# Patient Record
Sex: MALE | Race: WHITE | NOT HISPANIC OR LATINO | URBAN - METROPOLITAN AREA
[De-identification: names, ages, dates, MRNs, and addresses within clinical notes are randomized per-mention and may not be internally consistent; named-entity substitution may affect disease eponyms.]

---

## 2017-01-24 ENCOUNTER — OFFICE VISIT (OUTPATIENT)
Dept: FAMILY MEDICINE | Facility: CLINIC | Age: 37
End: 2017-01-24
Payer: COMMERCIAL

## 2017-01-24 VITALS
DIASTOLIC BLOOD PRESSURE: 81 MMHG | SYSTOLIC BLOOD PRESSURE: 134 MMHG | BODY MASS INDEX: 31.4 KG/M2 | TEMPERATURE: 99.2 F | HEIGHT: 69 IN | WEIGHT: 212 LBS | OXYGEN SATURATION: 98 % | HEART RATE: 61 BPM

## 2017-01-24 DIAGNOSIS — R07.89 ATYPICAL CHEST PAIN: Primary | ICD-10-CM

## 2017-01-24 DIAGNOSIS — R42 DIZZINESS: ICD-10-CM

## 2017-01-24 LAB
BASOPHILS # BLD AUTO: 0 10E9/L (ref 0–0.2)
BASOPHILS NFR BLD AUTO: 0.3 %
DIFFERENTIAL METHOD BLD: NORMAL
EOSINOPHIL # BLD AUTO: 0.1 10E9/L (ref 0–0.7)
EOSINOPHIL NFR BLD AUTO: 1.1 %
ERYTHROCYTE [DISTWIDTH] IN BLOOD BY AUTOMATED COUNT: 13 % (ref 10–15)
GLUCOSE BLD-MCNC: 88 MG/DL (ref 70–99)
HCT VFR BLD AUTO: 44.6 % (ref 40–53)
HGB BLD-MCNC: 15.3 G/DL (ref 13.3–17.7)
LYMPHOCYTES # BLD AUTO: 2.8 10E9/L (ref 0.8–5.3)
LYMPHOCYTES NFR BLD AUTO: 39.2 %
MCH RBC QN AUTO: 29.5 PG (ref 26.5–33)
MCHC RBC AUTO-ENTMCNC: 34.3 G/DL (ref 31.5–36.5)
MCV RBC AUTO: 86 FL (ref 78–100)
MONOCYTES # BLD AUTO: 0.6 10E9/L (ref 0–1.3)
MONOCYTES NFR BLD AUTO: 8.1 %
NEUTROPHILS # BLD AUTO: 3.7 10E9/L (ref 1.6–8.3)
NEUTROPHILS NFR BLD AUTO: 51.3 %
PLATELET # BLD AUTO: 175 10E9/L (ref 150–450)
RBC # BLD AUTO: 5.19 10E12/L (ref 4.4–5.9)
WBC # BLD AUTO: 7.2 10E9/L (ref 4–11)

## 2017-01-24 PROCEDURE — 85025 COMPLETE CBC W/AUTO DIFF WBC: CPT | Performed by: FAMILY MEDICINE

## 2017-01-24 PROCEDURE — 36415 COLL VENOUS BLD VENIPUNCTURE: CPT | Performed by: FAMILY MEDICINE

## 2017-01-24 PROCEDURE — 93000 ELECTROCARDIOGRAM COMPLETE: CPT | Performed by: FAMILY MEDICINE

## 2017-01-24 PROCEDURE — 99214 OFFICE O/P EST MOD 30 MIN: CPT | Performed by: FAMILY MEDICINE

## 2017-01-24 PROCEDURE — 82947 ASSAY GLUCOSE BLOOD QUANT: CPT | Performed by: FAMILY MEDICINE

## 2017-01-24 RX ORDER — MECLIZINE HYDROCHLORIDE 25 MG/1
25 TABLET ORAL 3 TIMES DAILY PRN
Qty: 30 TABLET | Refills: 0 | Status: SHIPPED | OUTPATIENT
Start: 2017-01-24 | End: 2017-02-24

## 2017-01-24 NOTE — PATIENT INSTRUCTIONS
1. I believe you have vertigo which is caused by a problem in the inner ear.    2. Take Meclizine as needed for symptoms - possible side effects are drowsiness, dry mouth or blurry vision.    3. Come back in one month for a physical with pre-visit labs. At that time I will ask you about the dizziness.

## 2017-01-24 NOTE — MR AVS SNAPSHOT
After Visit Summary   1/24/2017    Lam Brandt    MRN: [id number]           Patient Information     Date Of Birth          1980        Visit Information        Provider Department      1/24/2017 3:10 PM Kamron Galaviz MD [facility]        Today's Diagnoses     Atypical chest pain    -  1     Dizziness           Care Instructions    1. I believe you have vertigo which is caused by a problem in the inner ear.    2. Take Meclizine as needed for symptoms - possible side effects are drowsiness, dry mouth or blurry vision.    3. Come back in one month for a physical with pre-visit labs. At that time I will ask you about the dizziness.           Follow-ups after your visit        Who to contact     If you have questions or need follow up information about today's clinic visit or your schedule please contact [facility] directly at [phone].  Normal or non-critical lab and imaging results will be communicated to you by [employer], letter or phone within 4 business days after the clinic has received the results. If you do not hear from us within 7 days, please contact the clinic through [employer] or phone. If you have a critical or abnormal lab result, we will notify you by phone as soon as possible.  Submit refill requests through [email or web address] or call your pharmacy and they will forward the refill request to us. Please allow 3 business days for your refill to be completed.          Additional Information About Your Visit        MyChart Information     [email or web address] gives you secure access to your electronic health record. If you see a primary care provider, you can also send messages to your care team and make appointments. If you have questions, please call your primary care clinic.  If you do not have a primary care provider, please call [phone] and they will assist you.        Care EveryWhere ID     This is your Care EveryWhere ID. This could be used by other organizations  "to access your [facility] medical records  [id number]        Your Vitals Were     Pulse Temperature Height BMI (Body Mass Index) Pulse Oximetry       61 99.2  F (37.3  C) (Oral) 5' 9\" (1.753 m) 31.29 kg/m2 98%        Blood Pressure from Last 3 Encounters:   01/24/17 134/81   03/07/11 134/96    Weight from Last 3 Encounters:   01/24/17 212 lb (96.163 kg)   10/29/15 200 lb (90.719 kg)   03/07/11 197 lb (89.359 kg)              We Performed the Following     CBC with platelets differential     EKG 12-lead complete w/read - Clinics     Glucose whole blood          Today's Medication Changes          These changes are accurate as of: 1/24/17  4:39 PM.  If you have any questions, ask your nurse or doctor.               Start taking these medicines.        Dose/Directions    meclizine 25 MG tablet   Commonly known as:  ANTIVERT   Used for:  Dizziness   Started by:  Kamron Galaviz MD        Dose:  25 mg   Take 1 tablet (25 mg) by mouth 3 times daily as needed for dizziness   Quantity:  30 tablet   Refills:  0            Where to get your medicines      These medications were sent to [facility] [place], [place] - [address] AT [facility] [address]  [address], [place] [address]     Phone:  [phone]    - meclizine 25 MG tablet             Primary Care Provider    None Specified       No primary provider on file.        Thank you!     [facility]. Our goal is always to provide you with excellent care. Hearing back from our patients is one way we can continue to improve our services. Please take a few minutes to complete the written survey that you may receive in the mail after your visit with us. Thank you!             Your Updated Medication List - Protect others around you: Learn how to safely use, store and throw away your medicines at www.disposemymeds.org.          This list is accurate as of: 1/24/17  4:39 PM.  Always use " your most recent med list.                   Brand Name Dispense Instructions for use    DAILY MULTIVITAMIN PO      Take  by mouth daily.       meclizine 25 MG tablet    ANTIVERT    30 tablet    Take 1 tablet (25 mg) by mouth 3 times daily as needed for dizziness

## 2017-01-24 NOTE — PROGRESS NOTES
Dizziness x 2 weeks, clammy hands following, patient states he is having some anxiety only when he is dizzy

## 2017-01-24 NOTE — NURSING NOTE
"Chief Complaint   Patient presents with     Dizziness     x 2 weeks       Initial /81 mmHg  Pulse 61  Temp(Src) 99.2  F (37.3  C) (Oral)  Ht 5' 9\" (1.753 m)  Wt 212 lb (96.163 kg)  BMI 31.29 kg/m2  SpO2 98% Estimated body mass index is 31.29 kg/(m^2) as calculated from the following:    Height as of this encounter: 5' 9\" (1.753 m).    Weight as of this encounter: 212 lb (96.163 kg)..  BP completed using cuff size: large  Staff was masked during visit.    Khushboo Leos LPN    "

## 2017-01-27 NOTE — PROGRESS NOTES
"HPI:    Lam Brandt is a 36 year old male who presents for evaluation of:    Dizziness.  Duration: 2 weeks  Quality: spinning, intermittent  Exacerbated by: nothing.  Alleviated by: nothing  Associated symptoms: No nausea, vomiting.  Walking/balance: is hard due to loss of balance.   Ringing in ears: no  Hearing loss: no  h/o trauma: no       Atypical chest pain - this is vague. Kind of tightness on the upper chest. Mild. Not exertional. Not associated with nausea, shortness of breath, palpitations etc.     ROS:    Const: No fevers, weight changes or night sweats recently.  ENT: No runny nose, sore throat, hearing problems.  Resp: No cough or shortness of breath.  CV: No chest pain, dizziness or cardiac palpitations.  GI: No nausea, vomitting, diarrhea or constipation. Denies blood in stools or black stools.  : No dysuria, frequency or hematuria.    Social History     Social History     Marital Status:      Spouse Name: N/A     Number of Children: N/A     Years of Education: N/A     Occupational History     Not on file.     Social History Main Topics     Smoking status: Never Smoker      Smokeless tobacco: Never Used     Alcohol Use: Yes      Comment: 1 a week     Drug Use: No     Sexual Activity:     Partners: Female     Birth Control/ Protection: Condom     Other Topics Concern     Not on file     Social History Narrative           Exam:  /81 mmHg  Pulse 61  Temp(Src) 99.2  F (37.3  C) (Oral)  Ht 5' 9\" (1.753 m)  Wt 212 lb (96.163 kg)  BMI 31.29 kg/m2  SpO2 98%  Gen: Healthy appearing male in no acute distress.   HENT: TM's normal. Oropharynx normal. Oral mucosa moist without lesions. Hearing intact to finger rub. Rosenda-Hallpike maneuver negative.   Eyes: Conjunctiva and sclera normal. Pupils react normally to light. No nystagmus. Fundoscopic exam shows normal retinal vessels with sharp disc margins.   Neck: No enlarged lymph nodes, thyromegally or other masses.   Lungs: Good air " movement and otherwise clear.   CV: Heart RRR with no murmurs. No JVD, carotid bruits or leg edema.   Neuro: pt is alert and oriented. CN II-XII intact. Strength 5/5 in , biceps, quads, hams. Sensation intact upper and lower extremeties to soft touch. Reflexes are normal and symmetric in biceps, bracheoradialis, patella and ankle. No ankle clonus. Rapid repetative movements intact. Gait is normal. Negative. rhomberg.   Psych: Affect is anxious. Denies depressed mood.     Assessment and Plan - Decision Making    1. Atypical chest pain  EKG unremarkable. I don't suspect ACS or angina. No further evaluation is needed. He is advised to report worsening symptoms or new symptoms.  - EKG 12-lead complete w/read - Clinics    2. Dizziness  I the discussed the differential diagnosis which includes:   1. Labyrinthitis   2. BPV   3. Menier's disease   4. Vertebrobasilar insufficiency   5. Acoustic neuroma   6. Cerebellar dysfunction  CBC and glucose normal. EKG unremarkable. At this time I favor dx number 1 along with some anxiety. This should be self limited. Symptomatic treatment is all that is needed at this time. Try Meclizine prn. Side effects discussed including drowsiness, dry mouth and blurry vision. If his symptoms persist or get worse, we would have to look at the other diagnosis.  - CBC with platelets differential  - Glucose whole blood  - meclizine (ANTIVERT) 25 MG tablet; Take 1 tablet (25 mg) by mouth 3 times daily as needed for dizziness  Dispense: 30 tablet; Refill: 0      Written instructions given as follows:    Patient Instructions   1. I believe you have vertigo which is caused by a problem in the inner ear.    2. Take Meclizine as needed for symptoms - possible side effects are drowsiness, dry mouth or blurry vision.    3. Come back in one month for a physical with pre-visit labs. At that time I will ask you about the dizziness.

## 2017-02-20 ENCOUNTER — DOCUMENTATION ONLY (OUTPATIENT)
Dept: LAB | Facility: CLINIC | Age: 37
End: 2017-02-20

## 2017-02-20 DIAGNOSIS — Z00.00 ROUTINE GENERAL MEDICAL EXAMINATION AT A HEALTH CARE FACILITY: ICD-10-CM

## 2017-02-20 DIAGNOSIS — Z13.6 CARDIOVASCULAR SCREENING; LDL GOAL LESS THAN 160: ICD-10-CM

## 2017-02-20 DIAGNOSIS — Z13.6 CARDIOVASCULAR SCREENING; LDL GOAL LESS THAN 160: Primary | ICD-10-CM

## 2017-02-20 LAB
CHOLEST SERPL-MCNC: 283 MG/DL
HDLC SERPL-MCNC: 59 MG/DL
LDLC SERPL CALC-MCNC: 199 MG/DL
NONHDLC SERPL-MCNC: 224 MG/DL
TRIGL SERPL-MCNC: 124 MG/DL

## 2017-02-20 PROCEDURE — 80061 LIPID PANEL: CPT | Performed by: FAMILY MEDICINE

## 2017-02-20 PROCEDURE — 36415 COLL VENOUS BLD VENIPUNCTURE: CPT | Performed by: FAMILY MEDICINE

## 2017-02-20 NOTE — PROGRESS NOTES
Please review, associate diagnosis and sign pending laboratory future orders for patient  upcoming lab appointment TODAY on 02/20/17.    Thank you,   Sierra Chapman MLT

## 2017-02-20 NOTE — PROGRESS NOTES
...Your patient was in for lab test today and there are pending orders in [facility].  Please review and tag any additional orders to the lab appointment or enter orders as a future and I will watch for them.  Thank you   Marcia   @ [facility]

## 2017-02-24 ENCOUNTER — OFFICE VISIT (OUTPATIENT)
Dept: FAMILY MEDICINE | Facility: CLINIC | Age: 37
End: 2017-02-24
Payer: COMMERCIAL

## 2017-02-24 VITALS
WEIGHT: 210 LBS | TEMPERATURE: 97.6 F | HEART RATE: 54 BPM | HEIGHT: 69 IN | OXYGEN SATURATION: 98 % | DIASTOLIC BLOOD PRESSURE: 78 MMHG | SYSTOLIC BLOOD PRESSURE: 126 MMHG | BODY MASS INDEX: 31.1 KG/M2

## 2017-02-24 DIAGNOSIS — Z23 NEED FOR PROPHYLACTIC VACCINATION AND INOCULATION AGAINST INFLUENZA: ICD-10-CM

## 2017-02-24 DIAGNOSIS — E78.5 DYSLIPIDEMIA: ICD-10-CM

## 2017-02-24 DIAGNOSIS — Z00.00 ROUTINE GENERAL MEDICAL EXAMINATION AT A HEALTH CARE FACILITY: Primary | ICD-10-CM

## 2017-02-24 PROCEDURE — 90471 IMMUNIZATION ADMIN: CPT | Performed by: FAMILY MEDICINE

## 2017-02-24 PROCEDURE — 90686 IIV4 VACC NO PRSV 0.5 ML IM: CPT | Performed by: FAMILY MEDICINE

## 2017-02-24 PROCEDURE — 99395 PREV VISIT EST AGE 18-39: CPT | Mod: 25 | Performed by: FAMILY MEDICINE

## 2017-02-24 RX ORDER — ATORVASTATIN CALCIUM 20 MG/1
20 TABLET, FILM COATED ORAL DAILY
Qty: 90 TABLET | Refills: 3 | Status: SHIPPED | OUTPATIENT
Start: 2017-02-24 | End: 2018-04-12

## 2017-02-24 NOTE — PROGRESS NOTES
SUBJECTIVE:     CC: Lam Brandt is an 37 year old male who presents for preventative health visit.     Dyslipidemia - no h/o CAD, CVA, PAD or diabetes. Based on ATP4 (LDL > 189 for age 21-75), he should be on high intensity statin and if not tolerated, then moderate intesity.  Treatment:   Currently none.    Recent Labs   Lab Test  02/20/17   0945  03/19/11   0909   CHOL  283*  167   HDL  59  36*   LDL  199*  117   TRIG  124  65   CHOLHDLRATIO   --   4.6       Vertigo - occurred mid Jan 2017. Now resolved.    Previous atypical chest pain - this is vague. Kind of tightness on the upper chest. Mild. Not exertional. Not associated with nausea, shortness of breath, palpitations etc. No symptoms at this time.    Obesity - diet and exercise discussed. Snores. I asked him to see if his wife noted apnea at night.    Wt Readings from Last 5 Encounters:   02/24/17 210 lb (95.3 kg)   01/24/17 212 lb (96.2 kg)   10/29/15 200 lb (90.7 kg)   03/07/11 197 lb (89.4 kg)     Body mass index is 31.01 kg/(m^2).           SH:    Marital status:   Kids: 2  Employment:   at [employer]  Exercise: 3-6 times per week, running  Tobacco: no  Etoh: several times per year  Recreational drugs: no  Caffeine: coffee 2 cups per day, pop one per day    FH:    No FH of premature heart disease.    Today's PHQ-2 Score:   PHQ-2 ( 1999 Pfizer) 2/22/2017 1/24/2017   Q1: Little interest or pleasure in doing things - 0   Q2: Feeling down, depressed or hopeless - 0   PHQ-2 Score - 0   Little interest or pleasure in doing things Not at all -   Feeling down, depressed or hopeless Not at all -   PHQ-2 Score 0 -       Abuse: Current or Past(Physical, Sexual or Emotional)- No  Do you feel safe in your environment - Yes    Social History   Substance Use Topics     Smoking status: Never Smoker     Smokeless tobacco: Never Used     Alcohol use Yes      Comment: 1 a week     The patient does not drink >3 drinks per day nor >7  "drinks per week.    Last PSA: No results found for: PSA    Recent Labs   Lab Test  02/20/17   0945  03/19/11   0909   CHOL  283*  167   HDL  59  36*   LDL  199*  117   TRIG  124  65   CHOLHDLRATIO   --   4.6   NHDL  224*   --        Reviewed orders with patient. Reviewed health maintenance and updated orders accordingly - Yes    All Histories reviewed and updated in Epic.      ROS:  C: NEGATIVE for fever, chills, change in weight  I: NEGATIVE for worrisome rashes, moles or lesions  E: NEGATIVE for vision changes or irritation  ENT: NEGATIVE for ear, mouth and throat problems  R: NEGATIVE for significant cough or SOB  CV: NEGATIVE for chest pain, palpitations or peripheral edema  GI: NEGATIVE for nausea, abdominal pain, heartburn, or change in bowel habits   male: negative for dysuria, hematuria, decreased urinary stream, erectile dysfunction, urethral discharge  M: NEGATIVE for significant arthralgias or myalgia  N: NEGATIVE for weakness, dizziness or paresthesias  P: NEGATIVE for changes in mood or affect      OBJECTIVE:     /78 (Cuff Size: Adult Regular)  Pulse 54  Temp 97.6  F (36.4  C) (Oral)  Ht 5' 9\" (1.753 m)  Wt 210 lb (95.3 kg)  SpO2 98%  BMI 31.01 kg/m2  EXAM:  GENERAL: healthy, alert and no distress  EYES: Eyes grossly normal to inspection, PERRL and conjunctivae and sclerae normal  HENT: ear canals and TM's normal, nose and mouth without ulcers or lesions. Oropharynx crowded.  NECK: no adenopathy, no asymmetry, masses, or scars and thyroid normal to palpation  RESP: lungs clear to auscultation - no rales, rhonchi or wheezes  CV: regular rate and rhythm, normal S1 S2, no S3 or S4, no murmur, click or rub, no peripheral edema and peripheral pulses strong  ABDOMEN: soft, nontender, no hepatosplenomegaly, no masses and bowel sounds normal   (male): normal male genitalia without lesions or urethral discharge, no hernia  MS: no gross musculoskeletal defects noted, no edema  SKIN: no suspicious " "lesions or rashes  NEURO: Normal strength and tone, mentation intact and speech normal  PSYCH: mentation appears normal, affect normal/bright    ASSESSMENT/PLAN:         COUNSELING:  Reviewed preventive health counseling, as reflected in patient instructions       Regular exercise       Healthy diet/nutrition       reports that he has never smoked. He has never used smokeless tobacco.    Estimated body mass index is 31.31 kg/(m^2) as calculated from the following:    Height as of 1/24/17: 5' 9\" (1.753 m).    Weight as of 1/24/17: 212 lb (96.2 kg).   Weight management plan: Discussed healthy diet and exercise guidelines and patient will follow up in 12 months in clinic to re-evaluate.    Assessment and Plan - Decision Making    1. Routine general medical examination at a health care facility  Results of today's visit given to patient verbally and in writing. Various preventive issues discussed as below. Patient verbalized understanding.    - STD screen: declines  - Anemia screen: normal 1/24/17  - Diabetes screen: normal 1/24/17    2. Dyslipidemia  Start Lipitor 20 mg qd. Side effects discussed. See written instructions.  - atorvastatin (LIPITOR) 20 MG tablet; Take 1 tablet (20 mg) by mouth daily  Dispense: 90 tablet; Refill: 3  - Lipid panel reflex to direct LDL; Future  - Hepatic panel (Albumin, ALT, AST, Bili, Alk Phos, TP); Future    3. Need for prophylactic vaccination and inoculation against influenza    - FLU VAC, SPLIT VIRUS IM > 3 YO (QUADRIVALENT) [73000]  - Vaccine Administration, Initial [15123]      Written instructions given as follows:    Patient Instructions   1. I recommend including veggies, fresh fruits (3 to 5 servings), nuts (unsalted) in your daily diet. Cut down on carbs like bread, pasta, rice, potatoes. Cut down on red meats like burgers etc. Increase healthy proteins like beans, tofu, tuna, chicken and turkey.    2. Get regular exercise like jogging, biking, swimming at least 3 times per " week.      3. Avoid the sun or otherwise use sun screen - please look at the insert I gave you about melanoma.    4. See the dentist and eye doctor regularly.     5. I would like you to start Lipitor 20 mg daily. Possible side effects include liver enzyme elevation, muscle aches. Increase in glucose and memory problems have been reported. But I believe the benefits outweigh the risks.    6. Make a lab only appointment to give fasting blood sample. I will then contact you with results.    7. Ask your wife if you stop breathing at night and then let me know.

## 2017-02-24 NOTE — NURSING NOTE
"Chief Complaint   Patient presents with     Physical       Initial /78 (Cuff Size: Adult Regular)  Pulse 54  Temp 97.6  F (36.4  C) (Oral)  Ht 5' 9\" (1.753 m)  Wt 210 lb (95.3 kg)  SpO2 98%  BMI 31.01 kg/m2 Estimated body mass index is 31.01 kg/(m^2) as calculated from the following:    Height as of this encounter: 5' 9\" (1.753 m).    Weight as of this encounter: 210 lb (95.3 kg).  Medication Reconciliation: complete    Khushboo Leos LPN    "

## 2017-02-24 NOTE — LETTER
[facility]  [address] [facility] [id number] [phone]        December 29, 2017    Lam Brandt  [address] [facility] [address]              Dear Lam Brandt    This is to remind you that your fasting lab work is due.    You may call our office at [phone] to schedule an appointment.    Please disregard this notice if you have already had your labs drawn or made an appointment.        Sincerely,        Kamron Galaviz MD

## 2017-02-24 NOTE — PROGRESS NOTES
Injectable Influenza Immunization Documentation    1.  Is the person to be vaccinated sick today?  No    2. Does the person to be vaccinated have an allergy to eggs or to a component of the vaccine?  No    3. Has the person to be vaccinated today ever had a serious reaction to influenza vaccine in the past?  No    4. Has the person to be vaccinated ever had Guillain-Conyers syndrome?  No     Form completed by Khushboo Leos LPN

## 2017-02-24 NOTE — MR AVS SNAPSHOT
After Visit Summary   2/24/2017    Lam Brandt    MRN: [id number]           Patient Information     Date Of Birth          1980        Visit Information        Provider Department      2/24/2017 8:30 AM Kamron Galaviz MD [facility]        Today's Diagnoses     Dyslipidemia    -  1      Care Instructions    1. I recommend including veggies, fresh fruits (3 to 5 servings), nuts (unsalted) in your daily diet. Cut down on carbs like bread, pasta, rice, potatoes. Cut down on red meats like burgers etc. Increase healthy proteins like beans, tofu, tuna, chicken and turkey.    2. Get regular exercise like jogging, biking, swimming at least 3 times per week.      3. Avoid the sun or otherwise use sun screen - please look at the insert I gave you about melanoma.    4. See the dentist and eye doctor regularly.     5. I would like you to start Lipitor 20 mg daily. Possible side effects include liver enzyme elevation, muscle aches. Increase in glucose and memory problems have been reported. But I believe the benefits outweigh the risks.    6. Make a lab only appointment to give fasting blood sample. I will then contact you with results.    7. Ask your wife if you stop breathing at night and then let me know.                            Follow-ups after your visit        Future tests that were ordered for you today     Open Future Orders        Priority Expected Expires Ordered    Lipid panel reflex to direct LDL Routine  12/24/2017 2/24/2017    Hepatic panel (Albumin, ALT, AST, Bili, Alk Phos, TP) Routine  12/24/2017 2/24/2017            Who to contact     If you have questions or need follow up information about today's clinic visit or your schedule please contact [facility] directly at [phone].  Normal or non-critical lab and imaging results will be communicated to you by MyChart, letter or phone within 4 business days after the clinic has received the results. If  "you do not hear from us within 7 days, please contact the clinic through [employer] or phone. If you have a critical or abnormal lab result, we will notify you by phone as soon as possible.  Submit refill requests through [employer] or call your pharmacy and they will forward the refill request to us. Please allow 3 business days for your refill to be completed.          Additional Information About Your Visit        [email or web address] Information     [employer] gives you secure access to your electronic health record. If you see a primary care provider, you can also send messages to your care team and make appointments. If you have questions, please call your primary care clinic.  If you do not have a primary care provider, please call [phone] and they will assist you.        Care EveryWhere ID     This is your Care EveryWhere ID. This could be used by other organizations to access your [facility] medical records  [id number]        Your Vitals Were     Pulse Temperature Height Pulse Oximetry BMI (Body Mass Index)       54 97.6  F (36.4  C) (Oral) 5' 9\" (1.753 m) 98% 31.01 kg/m2        Blood Pressure from Last 3 Encounters:   02/24/17 126/78   01/24/17 134/81   03/07/11 134/96    Weight from Last 3 Encounters:   02/24/17 210 lb (95.3 kg)   01/24/17 212 lb (96.2 kg)   10/29/15 200 lb (90.7 kg)                 Today's Medication Changes          These changes are accurate as of: 2/24/17  9:00 AM.  If you have any questions, ask your nurse or doctor.               Start taking these medicines.        Dose/Directions    atorvastatin 20 MG tablet   Commonly known as:  LIPITOR   Used for:  Dyslipidemia   Started by:  Kamron Galaviz MD        Dose:  20 mg   Take 1 tablet (20 mg) by mouth daily   Quantity:  90 tablet   Refills:  3            Where to get your medicines      These medications were sent to [id number] [place] [facility] [address] [facility] [address], [place] " [address]     Phone:  [phone]     atorvastatin 20 MG tablet                Primary Care Provider Office Phone # Fax #    [facility] [phone] [address] [facility] [address]        Thank you!     Thank you for choosing [facility]  for your care. Our goal is always to provide you with excellent care. Hearing back from our patients is one way we can continue to improve our services. Please take a few minutes to complete the written survey that you may receive in the mail after your visit with us. Thank you!             Your Updated Medication List - Protect others around you: Learn how to safely use, store and throw away your medicines at www.disposemymeds.org.          This list is accurate as of: 2/24/17  9:00 AM.  Always use your most recent med list.                   Brand Name Dispense Instructions for use    atorvastatin 20 MG tablet    LIPITOR    90 tablet    Take 1 tablet (20 mg) by mouth daily       DAILY MULTIVITAMIN PO      Take  by mouth daily.

## 2017-02-24 NOTE — PATIENT INSTRUCTIONS
1. I recommend including veggies, fresh fruits (3 to 5 servings), nuts (unsalted) in your daily diet. Cut down on carbs like bread, pasta, rice, potatoes. Cut down on red meats like burgers etc. Increase healthy proteins like beans, tofu, tuna, chicken and turkey.    2. Get regular exercise like jogging, biking, swimming at least 3 times per week.      3. Avoid the sun or otherwise use sun screen - please look at the insert I gave you about melanoma.    4. See the dentist and eye doctor regularly.     5. I would like you to start Lipitor 20 mg daily. Possible side effects include liver enzyme elevation, muscle aches. Increase in glucose and memory problems have been reported. But I believe the benefits outweigh the risks.    6. Make a lab only appointment to give fasting blood sample. I will then contact you with results.    7. Ask your wife if you stop breathing at night and then let me know.

## 2018-02-05 ENCOUNTER — DOCUMENTATION ONLY (OUTPATIENT)
Dept: LAB | Facility: CLINIC | Age: 38
End: 2018-02-05

## 2018-02-05 NOTE — PROGRESS NOTES
This patient has overdue labs. A letter was sent on 12/29/2018 and there has been no lab appointment made. If you still want these labs done, please have your care team contact the patient to make a lab appointment. Otherwise, please have the labs discontinued and close the encounter.    Thank you,  [facility]

## 2018-04-14 DIAGNOSIS — E78.5 DYSLIPIDEMIA: ICD-10-CM

## 2018-04-14 PROCEDURE — 36415 COLL VENOUS BLD VENIPUNCTURE: CPT | Performed by: FAMILY MEDICINE

## 2018-04-14 PROCEDURE — 80076 HEPATIC FUNCTION PANEL: CPT | Performed by: FAMILY MEDICINE

## 2018-04-14 PROCEDURE — 80061 LIPID PANEL: CPT | Performed by: FAMILY MEDICINE

## 2018-04-16 LAB
ALBUMIN SERPL-MCNC: 4.1 G/DL (ref 3.4–5)
ALP SERPL-CCNC: 81 U/L (ref 40–150)
ALT SERPL W P-5'-P-CCNC: 33 U/L (ref 0–70)
AST SERPL W P-5'-P-CCNC: 25 U/L (ref 0–45)
BILIRUB DIRECT SERPL-MCNC: 0.2 MG/DL (ref 0–0.2)
BILIRUB SERPL-MCNC: 0.6 MG/DL (ref 0.2–1.3)
CHOLEST SERPL-MCNC: 157 MG/DL
HDLC SERPL-MCNC: 58 MG/DL
LDLC SERPL CALC-MCNC: 85 MG/DL
NONHDLC SERPL-MCNC: 99 MG/DL
PROT SERPL-MCNC: 7.5 G/DL (ref 6.8–8.8)
TRIGL SERPL-MCNC: 72 MG/DL

## 2018-04-25 ENCOUNTER — OFFICE VISIT (OUTPATIENT)
Dept: INTERNAL MEDICINE | Facility: CLINIC | Age: 38
End: 2018-04-25
Payer: COMMERCIAL

## 2018-04-25 VITALS
DIASTOLIC BLOOD PRESSURE: 82 MMHG | TEMPERATURE: 98.2 F | HEART RATE: 54 BPM | SYSTOLIC BLOOD PRESSURE: 130 MMHG | BODY MASS INDEX: 31.4 KG/M2 | WEIGHT: 212 LBS | RESPIRATION RATE: 16 BRPM | OXYGEN SATURATION: 99 % | HEIGHT: 69 IN

## 2018-04-25 DIAGNOSIS — Z13.29 SCREENING FOR THYROID DISORDER: ICD-10-CM

## 2018-04-25 DIAGNOSIS — R06.83 SNORING: ICD-10-CM

## 2018-04-25 DIAGNOSIS — E78.5 DYSLIPIDEMIA: ICD-10-CM

## 2018-04-25 DIAGNOSIS — Z00.00 ROUTINE GENERAL MEDICAL EXAMINATION AT A HEALTH CARE FACILITY: Primary | ICD-10-CM

## 2018-04-25 PROCEDURE — 36415 COLL VENOUS BLD VENIPUNCTURE: CPT | Performed by: INTERNAL MEDICINE

## 2018-04-25 PROCEDURE — 99396 PREV VISIT EST AGE 40-64: CPT | Performed by: INTERNAL MEDICINE

## 2018-04-25 PROCEDURE — 84443 ASSAY THYROID STIM HORMONE: CPT | Performed by: INTERNAL MEDICINE

## 2018-04-25 RX ORDER — ATORVASTATIN CALCIUM 20 MG/1
20 TABLET, FILM COATED ORAL DAILY
Qty: 90 TABLET | Refills: 3 | Status: SHIPPED | OUTPATIENT
Start: 2018-04-25 | End: 2019-07-03

## 2018-04-25 NOTE — MR AVS SNAPSHOT
After Visit Summary   4/25/2018    Lam Brandt    MRN: [id number]           Patient Information     Date Of Birth          1980        Visit Information        Provider Department      4/25/2018 5:00 PM Parviz [facility]ineda MD [facility]        Today's Diagnoses     Routine general medical examination at a health care facility    -  1    Dyslipidemia        Snoring        Screening for thyroid disorder          Care Instructions      Preventive Health Recommendations  Male Ages 40 to 49    Yearly exam:             See your health care provider every year in order to  o   Review health changes.   o   Discuss preventive care.    o   Review your medicines if your doctor has prescribed any.    You should be tested each year for STDs (sexually transmitted diseases) if you re at risk.     Have a cholesterol test every 5 years.     Have a colonoscopy (test for colon cancer) if someone in your family has had colon cancer or polyps before age 50.     After age 45, have a diabetes test (fasting glucose). If you are at risk for diabetes, you should have this test every 3 years.      Talk with your health care provider about whether or not a prostate cancer screening test (PSA) is right for you.    Shots: Get a flu shot each year. Get a tetanus shot every 10 years.     Nutrition:    Eat at least 5 servings of fruits and vegetables daily.     Eat whole-grain bread, whole-wheat pasta and brown rice instead of white grains and rice.     Talk to your provider about Calcium and Vitamin D.     Lifestyle    Exercise for at least 150 minutes a week (30 minutes a day, 5 days a week). This will help you control your weight and prevent disease.     Limit alcohol to one drink per day.     No smoking.     Wear sunscreen to prevent skin cancer.     See your dentist every six months for an exam and cleaning.              Follow-ups after your visit        Additional Services     SLEEP EVALUATION & MANAGEMENT  REFERRAL - [facility]  [phone] (Age 15 and up)       Please be aware that coverage of these services is subject to the terms and limitations of your health insurance plan.  Call member services at your health plan with any benefit or coverage questions.      Please bring the following to your appointment:    >>   List of current medications   >>   This referral request   >>   Any documents/labs given to you for this referral                      Follow-up notes from your care team     Return in about 1 year (around 4/25/2019) for Physical Exam.      Future tests that were ordered for you today     Open Future Orders        Priority Expected Expires Ordered    SLEEP EVALUATION & MANAGEMENT REFERRAL - [facility]  [phone] (Age 15 and up) Routine  4/25/2019 4/25/2018            Who to contact     Normal or non-critical lab and imaging results will be communicated to you by iDubbahart, letter or phone within 4 business days after the clinic has received the results. If you do not hear from us within 7 days, please contact the clinic through iDubbahart or phone. If you have a critical or abnormal lab result, we will notify you by phone as soon as possible.  Submit refill requests through [employer] or call your pharmacy and they will forward the refill request to us. Please allow 3 business days for your refill to be completed.          If you need to speak with a  for additional information , please call: [phone]             Additional Information About Your Visit        [employer] Information     [employer] gives you secure access to your electronic health record. If you see a primary care provider, you can also send messages to your care team and make appointments. If you have questions, please call your primary care clinic.  If you do not have a primary care provider, please call [phone] and they will assist you.        Care  "EveryWhere ID     This is your Care EveryWhere ID. This could be used by other organizations to access your [place] medical records  [id number]        Your Vitals Were     Pulse Temperature Respirations Height Pulse Oximetry BMI (Body Mass Index)    54 98.2  F (36.8  C) (Tympanic) 16 5' 9.25\" (1.759 m) 99% 31.08 kg/m2       Blood Pressure from Last 3 Encounters:   04/25/18 130/82   02/24/17 126/78   01/24/17 134/81    Weight from Last 3 Encounters:   04/25/18 212 lb (96.2 kg)   02/24/17 210 lb (95.3 kg)   01/24/17 212 lb (96.2 kg)              We Performed the Following     TSH with free T4 reflex          Where to get your medicines      These medications were sent to [facility] [id number] [place], [place] [address], [place] - [address] AT [facility] [address] [facility] [address]     Phone:  [phone]     atorvastatin 20 MG tablet          Primary Care Provider Office Phone # Fax #    Kamron Galaviz [phone] [address] [facility] [address]        Equal Access to Services     FREDERIC ROCHE AH: Hadii aad ku hadasho Soomaali, waaxda luqadaha, qaybta kaalmada adeegyada, waxay idiin haysuzen melissa fuentes. So [facility] [phone].    ATENCIÓN: Si habla español, tiene a cazares disposición servicios gratuitos de asistencia lingüística. dot al [phone].    We comply with applicable federal civil rights laws and [place] laws. We do not discriminate on the basis of race, color, national origin, age, disability, sex, sexual orientation, or gender identity.            Thank you!     Thank you for choosing [facility]  for your care. Our goal is always to provide you with excellent care. Hearing back from our patients is one way we can continue to improve our services. Please take a few minutes to complete the written survey that you may receive in the mail after your visit with us. Thank you!             Your Updated Medication List - Protect " others around you: Learn how to safely use, store and throw away your medicines at www.disposemymeds.org.          This list is accurate as of 4/25/18  5:46 PM.  Always use your most recent med list.                   Brand Name Dispense Instructions for use Diagnosis    atorvastatin 20 MG tablet    LIPITOR    90 tablet    Take 1 tablet (20 mg) by mouth daily    Dyslipidemia       DAILY MULTIVITAMIN PO      Take  by mouth daily.

## 2018-04-25 NOTE — PROGRESS NOTES
SUBJECTIVE:   CC: Lam Brandt is an 38 year old male who presents for preventative health visit.           Chief Complaint   Patient presents with     Physical     Blood Draw     lab results     Referral     sleep study       Healthy Habits:    Do you get at least three servings of calcium containing foods daily (dairy, green leafy vegetables, etc.)? yes    Amount of exercise or daily activities, outside of work: 4-5 day(s) per week    Problems taking medications regularly No    Medication side effects: No    Have you had an eye exam in the past two years? no    Do you see a dentist twice per year? 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     Wants to run a marathon this year      Today's PHQ-2 Score:   PHQ-2 ( 1999 Pfizer) 2/22/2017 1/24/2017   Q1: Little interest or pleasure in doing things - 0   Q2: Feeling down, depressed or hopeless - 0   PHQ-2 Score - 0   Q1: Little interest or pleasure in doing things Not at all -   Q2: Feeling down, depressed or hopeless Not at all -   PHQ-2 Score 0 -       Abuse: Current or Past(Physical, Sexual or Emotional)- No  Do you feel safe in your environment - Yes    Social History   Substance Use Topics     Smoking status: Never Smoker     Smokeless tobacco: Never Used     Alcohol use Yes      Comment: 1 a week      If you drink alcohol do you typically have >3 drinks per day or >7 drinks per week? No                      Last PSA: No results found for: PSA    Reviewed orders with patient. Reviewed health maintenance and updated orders accordingly - Yes  Labs reviewed in EPIC    Reviewed and updated as needed this visit by clinical staff  Tobacco  Allergies  Meds  Med Hx  Surg Hx  Fam Hx  Soc Hx        Reviewed and updated as needed this visit by  "Provider            ROS:  C: NEGATIVE for fever, chills, change in weight  I: NEGATIVE for worrisome rashes, moles or lesions  E: NEGATIVE for vision changes or irritation  ENT: NEGATIVE for ear, mouth and throat problems  R: NEGATIVE for significant cough or SOB  CV: NEGATIVE for chest pain, palpitations or peripheral edema  GI: NEGATIVE for nausea, abdominal pain, heartburn, or change in bowel habits   male: negative for dysuria, hematuria, decreased urinary stream, erectile dysfunction, urethral discharge  M: NEGATIVE for significant arthralgias or myalgia  N: NEGATIVE for weakness, dizziness or paresthesias  P: NEGATIVE for changes in mood or affect    OBJECTIVE:   /82  Pulse 54  Temp 98.2  F (36.8  C) (Tympanic)  Resp 16  Ht 5' 9.25\" (1.759 m)  Wt 212 lb (96.2 kg)  SpO2 99%  BMI 31.08 kg/m2  EXAM:  GENERAL: healthy, alert and no distress  EYES: Eyes grossly normal to inspection, PERRL and conjunctivae and sclerae normal  HENT: ear canals and TM's normal, nose and mouth without ulcers or lesions  NECK: no adenopathy, no asymmetry, masses, or scars and thyroid normal to palpation  RESP: lungs clear to auscultation - no rales, rhonchi or wheezes  CV: regular rate and rhythm, normal S1 S2, no S3 or S4, no murmur, click or rub, no peripheral edema and peripheral pulses strong  ABDOMEN: soft, nontender, no hepatosplenomegaly, no masses and bowel sounds normal  MS: no gross musculoskeletal defects noted, no edema  SKIN: no suspicious lesions or rashes  NEURO: Normal strength and tone, mentation intact and speech normal  PSYCH: mentation appears normal, affect normal/bright    ASSESSMENT/PLAN:   1. Routine general medical examination at a health care facility    2. Dyslipidemia  Stable - continue  - atorvastatin (LIPITOR) 20 MG tablet; Take 1 tablet (20 mg) by mouth daily  Dispense: 90 tablet; Refill: 3    3. Snoring  Home sleep screen vs overnight [facility].  - SLEEP EVALUATION & MANAGEMENT REFERRAL - ADULT " "-[facility] [place]  [phone] (Age 15 and up); Future    4. Screening for thyroid disorder  - TSH with free T4 reflex    COUNSELING:  Reviewed preventive health counseling, as reflected in patient instructions       Regular exercise       Healthy diet/nutrition    BP Screening:   Last 3 BP Readings:    BP Readings from Last 3 Encounters:   04/25/18 130/82   02/24/17 126/78   01/24/17 134/81       The following was recommended to the patient:  Re-screen BP within a year and recommended lifestyle modifications   reports that he has never smoked. He has never used smokeless tobacco.    Estimated body mass index is 31.08 kg/(m^2) as calculated from the following:    Height as of this encounter: 5' 9.25\" (1.759 m).    Weight as of this encounter: 212 lb (96.2 kg).   Weight management plan: Discussed healthy diet and exercise guidelines and patient will follow up in 12 months in clinic to re-evaluate.    Counseling Resources:  ATP IV Guidelines  Pooled Cohorts Equation Calculator  FRAX Risk Assessment  ICSI Preventive Guidelines  Dietary Guidelines for Americans, 2010  USDA's MyPlate  ASA Prophylaxis  Lung CA Screening    Parviz Pineda MD  [facility]  "

## 2018-04-26 LAB — TSH SERPL DL<=0.005 MIU/L-ACNC: 2.18 MU/L (ref 0.4–4)

## 2018-06-13 DIAGNOSIS — E78.5 DYSLIPIDEMIA: ICD-10-CM

## 2018-06-14 RX ORDER — ATORVASTATIN CALCIUM 20 MG/1
TABLET, FILM COATED ORAL
Qty: 30 TABLET | Refills: 0 | OUTPATIENT
Start: 2018-06-14

## 2019-07-03 ENCOUNTER — MYC MEDICAL ADVICE (OUTPATIENT)
Dept: INTERNAL MEDICINE | Facility: CLINIC | Age: 39
End: 2019-07-03

## 2019-07-03 DIAGNOSIS — E78.5 DYSLIPIDEMIA: ICD-10-CM

## 2019-07-03 RX ORDER — ATORVASTATIN CALCIUM 20 MG/1
20 TABLET, FILM COATED ORAL DAILY
Qty: 90 TABLET | Refills: 0 | Status: SHIPPED | OUTPATIENT
Start: 2019-07-03 | End: 2019-08-09

## 2019-07-03 NOTE — TELEPHONE ENCOUNTER
Medication is being filled for 1 time refill only due to:  Patient has a physical scheduled on 8/9/19.     My Chart message sent to patient.

## 2019-08-07 ASSESSMENT — ENCOUNTER SYMPTOMS
FEVER: 0
ABDOMINAL PAIN: 0
CHILLS: 0
HEMATOCHEZIA: 0
COUGH: 0
MYALGIAS: 0
HEARTBURN: 0
NERVOUS/ANXIOUS: 0
EYE PAIN: 0
DIARRHEA: 0
FREQUENCY: 0
SHORTNESS OF BREATH: 0
JOINT SWELLING: 0
NAUSEA: 0
PARESTHESIAS: 0
PALPITATIONS: 0
ARTHRALGIAS: 0
SORE THROAT: 0
DIZZINESS: 0
HEMATURIA: 0
WEAKNESS: 0
CONSTIPATION: 0
DYSURIA: 0
HEADACHES: 0

## 2019-08-09 ENCOUNTER — OFFICE VISIT (OUTPATIENT)
Dept: INTERNAL MEDICINE | Facility: CLINIC | Age: 39
End: 2019-08-09
Payer: COMMERCIAL

## 2019-08-09 VITALS
TEMPERATURE: 97 F | WEIGHT: 210 LBS | BODY MASS INDEX: 30.06 KG/M2 | SYSTOLIC BLOOD PRESSURE: 117 MMHG | HEART RATE: 63 BPM | RESPIRATION RATE: 16 BRPM | HEIGHT: 70 IN | DIASTOLIC BLOOD PRESSURE: 72 MMHG

## 2019-08-09 DIAGNOSIS — R06.83 SNORING: ICD-10-CM

## 2019-08-09 DIAGNOSIS — E78.5 DYSLIPIDEMIA: ICD-10-CM

## 2019-08-09 DIAGNOSIS — Z13.1 ENCOUNTER FOR SCREENING EXAMINATION FOR IMPAIRED GLUCOSE REGULATION AND DIABETES MELLITUS: ICD-10-CM

## 2019-08-09 DIAGNOSIS — Z00.00 ROUTINE GENERAL MEDICAL EXAMINATION AT A HEALTH CARE FACILITY: Primary | ICD-10-CM

## 2019-08-09 LAB
CHOLEST SERPL-MCNC: 183 MG/DL
GLUCOSE SERPL-MCNC: 96 MG/DL (ref 70–99)
HDLC SERPL-MCNC: 68 MG/DL
LDLC SERPL CALC-MCNC: 96 MG/DL
NONHDLC SERPL-MCNC: 115 MG/DL
TRIGL SERPL-MCNC: 96 MG/DL

## 2019-08-09 PROCEDURE — 82947 ASSAY GLUCOSE BLOOD QUANT: CPT | Performed by: INTERNAL MEDICINE

## 2019-08-09 PROCEDURE — 99396 PREV VISIT EST AGE 40-64: CPT | Performed by: INTERNAL MEDICINE

## 2019-08-09 PROCEDURE — 36415 COLL VENOUS BLD VENIPUNCTURE: CPT | Performed by: INTERNAL MEDICINE

## 2019-08-09 PROCEDURE — 80061 LIPID PANEL: CPT | Performed by: INTERNAL MEDICINE

## 2019-08-09 RX ORDER — ATORVASTATIN CALCIUM 20 MG/1
20 TABLET, FILM COATED ORAL DAILY
Qty: 90 TABLET | Refills: 0 | Status: SHIPPED | OUTPATIENT
Start: 2019-08-09 | End: 2019-08-09

## 2019-08-09 RX ORDER — ATORVASTATIN CALCIUM 20 MG/1
20 TABLET, FILM COATED ORAL DAILY
Qty: 90 TABLET | Refills: 3 | Status: SHIPPED | OUTPATIENT
Start: 2019-08-09 | End: 2020-09-17

## 2019-08-09 ASSESSMENT — ENCOUNTER SYMPTOMS
ABDOMINAL PAIN: 0
DIARRHEA: 0
NAUSEA: 0
COUGH: 0
MYALGIAS: 0
HEADACHES: 0
DIZZINESS: 0
WEAKNESS: 0
JOINT SWELLING: 0
DYSURIA: 0
SHORTNESS OF BREATH: 0
PALPITATIONS: 0
FREQUENCY: 0
NERVOUS/ANXIOUS: 0
SORE THROAT: 0
HEMATURIA: 0
CONSTIPATION: 0
ARTHRALGIAS: 0
CHILLS: 0
PARESTHESIAS: 0
HEARTBURN: 0
HEMATOCHEZIA: 0
EYE PAIN: 0
FEVER: 0

## 2019-08-09 ASSESSMENT — MIFFLIN-ST. JEOR: SCORE: 1859.83

## 2019-08-09 NOTE — PROGRESS NOTES
SUBJECTIVE:   CC: Lam Brandt is an 39 year old woman who presents for preventive health visit.     Healthy Habits:     Getting at least 3 servings of Calcium per day:  Yes    Bi-annual eye exam:  NO    Dental care twice a year:  Yes    Sleep apnea or symptoms of sleep apnea:  Daytime drowsiness and Excessive snoring    Diet:  Regular (no restrictions)    Frequency of exercise:  2-3 days/week    Duration of exercise:  15-30 minutes    Taking medications regularly:  Yes    Medication side effects:  None    PHQ-2 Total Score: 0    Additional concerns today:  No      Today's PHQ-2 Score:   PHQ-2 ( 1999 Pfizer) 8/7/2019   Q1: Little interest or pleasure in doing things 0   Q2: Feeling down, depressed or hopeless 0   PHQ-2 Score 0   Q1: Little interest or pleasure in doing things Not at all   Q2: Feeling down, depressed or hopeless Not at all   PHQ-2 Score 0       Abuse: Current or Past(Physical, Sexual or Emotional)- No  Do you feel safe in your environment? Yes    Social History     Tobacco Use     Smoking status: Never Smoker     Smokeless tobacco: Never Used   Substance Use Topics     Alcohol use: Yes     Comment: 1 a week     If you drink alcohol do you typically have >3 drinks per day or >7 drinks per week? No    Alcohol Use 8/9/2019   Prescreen: >3 drinks/day or >7 drinks/week? -   Prescreen: >3 drinks/day or >7 drinks/week? No       Reviewed orders with patient.  Reviewed health maintenance and updated orders accordingly - Yes           Reviewed and updated as needed this visit by clinical staff  Tobacco  Allergies  Meds         Reviewed and updated as needed this visit by Provider          Review of Systems   Constitutional: Negative for chills and fever.   HENT: Negative for congestion, ear pain, hearing loss and sore throat.    Eyes: Negative for pain and visual disturbance.   Respiratory: Negative for cough and shortness of breath.    Cardiovascular: Negative for chest pain, palpitations and  "peripheral edema.   Gastrointestinal: Negative for abdominal pain, constipation, diarrhea, heartburn, hematochezia and nausea.   Genitourinary: Negative for discharge, dysuria, frequency, genital sores, hematuria, impotence and urgency.   Musculoskeletal: Negative for arthralgias, joint swelling and myalgias.   Skin: Negative for rash.   Neurological: Negative for dizziness, weakness, headaches and paresthesias.   Psychiatric/Behavioral: Negative for mood changes. The patient is not nervous/anxious.      Comprehensive ROS negative.     OBJECTIVE:   /72   Pulse 63   Temp 97  F (36.1  C) (Tympanic)   Resp 16   Ht 1.772 m (5' 9.75\")   Wt 95.3 kg (210 lb)   BMI 30.35 kg/m    Physical Exam  GENERAL: healthy, alert and no distress  EYES: Eyes grossly normal to inspection, PERRL and conjunctivae and sclerae normal  HENT: ear canals and TM's normal, nose and mouth without ulcers or lesions  NECK: no adenopathy, no asymmetry, masses, or scars and thyroid normal to palpation  RESP: lungs clear to auscultation - no rales, rhonchi or wheezes  CV: regular rate and rhythm, normal S1 S2, no S3 or S4, no murmur, click or rub, no peripheral edema and peripheral pulses strong  ABDOMEN: soft, nontender, no hepatosplenomegaly, no masses and bowel sounds normal  MS: no gross musculoskeletal defects noted, no edema  SKIN: no suspicious lesions or rashes on skin exposed for exam   NEURO: Normal strength and tone, mentation intact and speech normal  PSYCH: mentation appears normal, affect normal/bright    Diagnostic Test Results:  Labs reviewed in Epic    ASSESSMENT/PLAN:   1. Routine general medical examination at a health care facility    2. Dyslipidemia  - Lipid panel reflex to direct LDL Fasting  - atorvastatin (LIPITOR) 20 MG tablet; Take 1 tablet (20 mg) by mouth daily  Dispense: 90 tablet; Refill: 3    3. Encounter for screening examination for impaired glucose regulation and diabetes mellitus  - Glucose    4. Snoring  - " "SLEEP EVALUATION & MANAGEMENT REFERRAL - ADULT -[facility] [place] [place]  [phone] (Age 15 and up); Future    COUNSELING:  Reviewed preventive health counseling, as reflected in patient instructions       Regular exercise       Healthy diet/nutrition    Estimated body mass index is 30.35 kg/m  as calculated from the following:    Height as of this encounter: 1.772 m (5' 9.75\").    Weight as of this encounter: 95.3 kg (210 lb).    Weight management plan: Discussed healthy diet and exercise guidelines     reports that he has never smoked. He has never used smokeless tobacco.      Counseling Resources:  ATP IV Guidelines  Pooled Cohorts Equation Calculator  Breast Cancer Risk Calculator  FRAX Risk Assessment  ICSI Preventive Guidelines  Dietary Guidelines for Americans, 2010  USDA's MyPlate  ASA Prophylaxis  Lung CA Screening    Parivz Pineda MD  [facility]        "

## 2019-10-05 ENCOUNTER — HEALTH MAINTENANCE LETTER (OUTPATIENT)
Age: 39
End: 2019-10-05

## 2020-09-16 DIAGNOSIS — E78.5 DYSLIPIDEMIA: ICD-10-CM

## 2020-09-17 RX ORDER — ATORVASTATIN CALCIUM 20 MG/1
20 TABLET, FILM COATED ORAL DAILY
Qty: 90 TABLET | Refills: 0 | Status: SHIPPED | OUTPATIENT
Start: 2020-09-17 | End: 2021-03-19

## 2020-11-14 ENCOUNTER — HEALTH MAINTENANCE LETTER (OUTPATIENT)
Age: 40
End: 2020-11-14

## 2021-03-15 DIAGNOSIS — E78.5 DYSLIPIDEMIA: ICD-10-CM

## 2021-03-15 DIAGNOSIS — Z13.1 SCREENING FOR DIABETES MELLITUS: Primary | ICD-10-CM

## 2021-03-18 ENCOUNTER — DOCUMENTATION ONLY (OUTPATIENT)
Dept: LAB | Facility: CLINIC | Age: 41
End: 2021-03-18

## 2021-03-18 DIAGNOSIS — E78.5 DYSLIPIDEMIA: Primary | ICD-10-CM

## 2021-03-18 DIAGNOSIS — Z13.1 SCREENING FOR DIABETES MELLITUS: ICD-10-CM

## 2021-03-18 NOTE — PROGRESS NOTES
Patient has an up coming lab appointment on 3.24.2021. Please review and place future orders that may be needed.     Thank you  Dorothy Ashraf MLT ([facility])

## 2021-03-18 NOTE — TELEPHONE ENCOUNTER
I called patient, scheduled fasting lab only followed by physical.   He says he ran out of atorvastatin about a week ago.    Last Rx was sent by Dr. Pineda so routed to Dr. Hurtado who will see patient on 3/26 to send refill.    Also provider to put in needed labs, I did pend FLP.    Frances Bowen RN  [facility]

## 2021-03-19 RX ORDER — ATORVASTATIN CALCIUM 20 MG/1
20 TABLET, FILM COATED ORAL DAILY
Qty: 30 TABLET | Refills: 0 | Status: SHIPPED | OUTPATIENT
Start: 2021-03-19 | End: 2021-03-26

## 2021-03-19 NOTE — TELEPHONE ENCOUNTER
Rx for 30 days.  Fasting labs placed.  Advise to schedule and complete fasting laboratory appointment prior to my appointment.    1. Dyslipidemia  - atorvastatin (LIPITOR) 20 MG tablet; Take 1 tablet (20 mg) by mouth daily  Dispense: 30 tablet; Refill: 0  - Lipid panel reflex to direct LDL Fasting; Future    2. Screening for diabetes mellitus  - Basic metabolic panel; Future

## 2021-03-23 ASSESSMENT — ENCOUNTER SYMPTOMS
CHILLS: 0
COUGH: 0
NERVOUS/ANXIOUS: 0
ABDOMINAL PAIN: 0
PARESTHESIAS: 0
CONSTIPATION: 0
DIARRHEA: 0
HEARTBURN: 0
EYE PAIN: 0
FREQUENCY: 0
NAUSEA: 0
SORE THROAT: 0
DIZZINESS: 0
HEMATURIA: 0
SHORTNESS OF BREATH: 0
ARTHRALGIAS: 1
DYSURIA: 0
WEAKNESS: 0
JOINT SWELLING: 0
FEVER: 0
PALPITATIONS: 0
HEMATOCHEZIA: 0
MYALGIAS: 0
HEADACHES: 0

## 2021-03-24 DIAGNOSIS — Z13.1 SCREENING FOR DIABETES MELLITUS: ICD-10-CM

## 2021-03-24 DIAGNOSIS — E78.5 DYSLIPIDEMIA: ICD-10-CM

## 2021-03-24 LAB
ANION GAP SERPL CALCULATED.3IONS-SCNC: 3 MMOL/L (ref 3–14)
BUN SERPL-MCNC: 24 MG/DL (ref 7–30)
CALCIUM SERPL-MCNC: 8.9 MG/DL (ref 8.5–10.1)
CHLORIDE SERPL-SCNC: 108 MMOL/L (ref 94–109)
CHOLEST SERPL-MCNC: 186 MG/DL
CO2 SERPL-SCNC: 30 MMOL/L (ref 20–32)
CREAT SERPL-MCNC: 1.02 MG/DL (ref 0.66–1.25)
GFR SERPL CREATININE-BSD FRML MDRD: 90 ML/MIN/{1.73_M2}
GLUCOSE SERPL-MCNC: 89 MG/DL (ref 70–99)
HDLC SERPL-MCNC: 72 MG/DL
LDLC SERPL CALC-MCNC: 101 MG/DL
NONHDLC SERPL-MCNC: 114 MG/DL
POTASSIUM SERPL-SCNC: 3.8 MMOL/L (ref 3.4–5.3)
SODIUM SERPL-SCNC: 141 MMOL/L (ref 133–144)
TRIGL SERPL-MCNC: 63 MG/DL

## 2021-03-24 PROCEDURE — 36415 COLL VENOUS BLD VENIPUNCTURE: CPT | Performed by: FAMILY MEDICINE

## 2021-03-24 PROCEDURE — 80048 BASIC METABOLIC PNL TOTAL CA: CPT | Performed by: FAMILY MEDICINE

## 2021-03-24 PROCEDURE — 80061 LIPID PANEL: CPT | Performed by: FAMILY MEDICINE

## 2021-03-25 ASSESSMENT — ENCOUNTER SYMPTOMS
DIZZINESS: 0
HEMATOCHEZIA: 0
ABDOMINAL PAIN: 0
NAUSEA: 0
PALPITATIONS: 0
NERVOUS/ANXIOUS: 0
DYSURIA: 0
PARESTHESIAS: 0
FREQUENCY: 0
FEVER: 0
HEADACHES: 0
EYE PAIN: 0
DIARRHEA: 0
JOINT SWELLING: 0
CONSTIPATION: 0
HEMATURIA: 0
MYALGIAS: 0
CHILLS: 0
SHORTNESS OF BREATH: 0
COUGH: 0
HEARTBURN: 0
SORE THROAT: 0
WEAKNESS: 0

## 2021-03-25 NOTE — PROGRESS NOTES
SUBJECTIVE:   CC: Lam Brandt is an 41 year old male who presents for preventative health visit.       Patient has been advised of split billing requirements and indicates understanding: Yes  Healthy Habits:     Getting at least 3 servings of Calcium per day:  Yes    Bi-annual eye exam:  NO    Dental care twice a year:  NO    Sleep apnea or symptoms of sleep apnea:  Excessive snoring    Diet:  Regular (no restrictions)    Frequency of exercise:  6-7 days/week    Duration of exercise:  45-60 minutes    Taking medications regularly:  Yes    Medication side effects:  Not applicable    PHQ-2 Total Score: 0    Additional concerns today:  Yes          Right wrist pain    Today's PHQ-2 Score:   PHQ-2 ( 1999 Pfizer) 3/23/2021   Q1: Little interest or pleasure in doing things 0   Q2: Feeling down, depressed or hopeless 0   PHQ-2 Score 0   Q1: Little interest or pleasure in doing things Not at all   Q2: Feeling down, depressed or hopeless Not at all   PHQ-2 Score 0       Abuse: Current or Past(Physical, Sexual or Emotional)- No  Do you feel safe in your environment? Yes    Have you ever done Advance Care Planning? (For example, a Health Directive, POLST, or a discussion with a medical provider or your loved ones about your wishes): No, advance care planning information given to patient to review.  Advanced care planning was discussed at today's visit.    Social History     Tobacco Use     Smoking status: Never Smoker     Smokeless tobacco: Never Used   Substance Use Topics     Alcohol use: Yes     Comment: 1 a week     If you drink alcohol do you typically have >3 drinks per day or >7 drinks per week? No    Alcohol Use 3/23/2021   Prescreen: >3 drinks/day or >7 drinks/week? No   Prescreen: >3 drinks/day or >7 drinks/week? -       Last PSA: No results found for: PSA      Reviewed and updated as needed this visit by clinical staff  Tobacco  Allergies  Meds   Med Hx  Surg Hx  Fam Hx  Soc Hx        Reviewed and  "updated as needed this visit by Provider             1. Physical exam    2. Hyperlipidemia: Currently on lipitor.     3. Right wrist pain: States of doing more push-up.  Radiates up.  Left hand dominant.  Hard to push off the ground.  No swelling or redness.  Hard to push off the ground.     Review of Systems   Constitutional: Negative for chills and fever.   HENT: Negative for congestion, ear pain, hearing loss and sore throat.    Eyes: Negative for pain and visual disturbance.   Respiratory: Negative for cough and shortness of breath.    Cardiovascular: Negative for chest pain, palpitations and peripheral edema.   Gastrointestinal: Negative for abdominal pain, constipation, diarrhea, heartburn, hematochezia and nausea.   Genitourinary: Positive for urgency. Negative for discharge, dysuria, frequency, genital sores, hematuria and impotence.   Musculoskeletal: Positive for arthralgias (right wrist). Negative for joint swelling and myalgias.   Skin: Negative for rash.   Neurological: Negative for dizziness, weakness, headaches and paresthesias.   Psychiatric/Behavioral: Negative for mood changes. The patient is not nervous/anxious.          OBJECTIVE:   /73 (BP Location: Left arm, Cuff Size: Adult Large)   Pulse 60   Temp 97  F (36.1  C) (Tympanic)   Ht 1.765 m (5' 9.5\")   Wt 89.2 kg (196 lb 9.6 oz)   SpO2 99%   BMI 28.62 kg/m      Physical Exam  Constitutional:       General: He is not in acute distress.  HENT:      Head: Normocephalic and atraumatic.      Right Ear: External ear normal.      Left Ear: External ear normal.      Nose: Nose normal.      Mouth/Throat:      Pharynx: No oropharyngeal exudate.   Eyes:      General:         Right eye: No discharge.         Left eye: No discharge.      Conjunctiva/sclera: Conjunctivae normal.      Pupils: Pupils are equal, round, and reactive to light.   Neck:      Musculoskeletal: Normal range of motion and neck supple.      Thyroid: No thyromegaly.      " Trachea: No tracheal deviation.   Cardiovascular:      Rate and Rhythm: Normal rate and regular rhythm.      Heart sounds: Normal heart sounds. No murmur.   Pulmonary:      Effort: No respiratory distress.      Breath sounds: Normal breath sounds. No wheezing.   Chest:      Chest wall: No tenderness.   Abdominal:      General: There is no distension.      Palpations: Abdomen is soft. There is no mass.      Tenderness: There is no abdominal tenderness. There is no guarding.   Musculoskeletal: Normal range of motion.      Comments: Wrist: Normal symmetry, able to flex/extend against resistance, NTP   Lymphadenopathy:      Cervical: No cervical adenopathy.   Skin:     General: Skin is warm.      Findings: No erythema or rash.   Neurological:      Mental Status: He is alert and oriented to person, place, and time.      Cranial Nerves: No cranial nerve deficit.      Coordination: Coordination normal.         Component      Latest Ref Rng & Units 3/24/2021   Sodium      133 - 144 mmol/L 141   Potassium      3.4 - 5.3 mmol/L 3.8   Chloride      94 - 109 mmol/L 108   Carbon Dioxide      20 - 32 mmol/L 30   Anion Gap      3 - 14 mmol/L 3   Glucose      70 - 99 mg/dL 89   Urea Nitrogen      7 - 30 mg/dL 24   Creatinine      0.66 - 1.25 mg/dL 1.02   GFR Estimate      >60 mL/min/1.73:m2 90   GFR Estimate If Black      >60 mL/min/1.73:m2 >90   Calcium      8.5 - 10.1 mg/dL 8.9   Cholesterol      <200 mg/dL 186   Triglycerides      <150 mg/dL 63   HDL Cholesterol      >39 mg/dL 72   LDL Cholesterol Calculated      <100 mg/dL 101 (H)   Non HDL Cholesterol      <130 mg/dL 114       ASSESSMENT/PLAN:   1. Routine general medical examination at a health care facility    2. Dyslipidemia  Stable and improved.  Continue with current medications.   - atorvastatin (LIPITOR) 20 MG tablet; Take 1 tablet (20 mg) by mouth daily  Dispense: 90 tablet; Refill: 3    3. Need for Tdap vaccination  - TDAP VACCINE (Adacel, Boostrix)  [[id number]]    4.  "Right wrist pain  Most likely sprain.  Supportive care.  May use OTC wrist splint.     Patient has been advised of split billing requirements and indicates understanding: Yes  COUNSELING:   Reviewed preventive health counseling, as reflected in patient instructions       Regular exercise       Healthy diet/nutrition    Estimated body mass index is 28.62 kg/m  as calculated from the following:    Height as of this encounter: 1.765 m (5' 9.5\").    Weight as of this encounter: 89.2 kg (196 lb 9.6 oz).     Weight management plan: Discussed healthy diet and exercise guidelines    He reports that he has never smoked. He has never used smokeless tobacco.      Counseling Resources:  ATP IV Guidelines  Pooled Cohorts Equation Calculator  FRAX Risk Assessment  ICSI Preventive Guidelines  Dietary Guidelines for Americans, 2010  USDA's MyPlate  ASA Prophylaxis  Lung CA Screening    Jakob Hurtado DO  [facility]  "

## 2021-03-25 NOTE — PATIENT INSTRUCTIONS
Katerina Gordon,    Thank you for allowing [facility] to manage your care.    I sent your prescriptions to your pharmacy.    May do a trial of a wrist splint (over the counter)    If you have any questions or concerns, please feel free to call us at [phone].    Sincerely,    Dr. Hurtado    Did you know?      You can schedule a video visit for follow-up appointments as well as future appointments for certain conditions.  Please see the below link.     [email or web address]    If you have not already done so,  I encourage you to sign up for [employer] ([email or web address]).  This will allow you to review your results, securely communicate with a provider, and schedule virtual visits as well.        Preventive Health Recommendations  Male Ages 40 to 49    Yearly exam:             See your health care provider every year in order to  o   Review health changes.   o   Discuss preventive care.    o   Review your medicines if your doctor has prescribed any.    You should be tested each year for STDs (sexually transmitted diseases) if you re at risk.     Have a cholesterol test every 5 years.     Have a colonoscopy (test for colon cancer) if someone in your family has had colon cancer or polyps before age 50.     After age 45, have a diabetes test (fasting glucose). If you are at risk for diabetes, you should have this test every 3 years.      Talk with your health care provider about whether or not a prostate cancer screening test (PSA) is right for you.    Shots: Get a flu shot each year. Get a tetanus shot every 10 years.     Nutrition:    Eat at least 5 servings of fruits and vegetables daily.     Eat whole-grain bread, whole-wheat pasta and brown rice instead of white grains and rice.     Get adequate Calcium and Vitamin D.     Lifestyle    Exercise for at least 150 minutes a week (30 minutes a day, 5 days a week). This will help you control your weight and  prevent disease.     Limit alcohol to one drink per day.     No smoking.     Wear sunscreen to prevent skin cancer.     See your dentist every six months for an exam and cleaning.      Preventive Health Recommendations  Male Ages 40 to 49    Yearly exam:             See your health care provider every year in order to  o   Review health changes.   o   Discuss preventive care.    o   Review your medicines if your doctor has prescribed any.    You should be tested each year for STDs (sexually transmitted diseases) if you re at risk.     Have a cholesterol test every 5 years.     Have a colonoscopy (test for colon cancer) if someone in your family has had colon cancer or polyps before age 50.     After age 45, have a diabetes test (fasting glucose). If you are at risk for diabetes, you should have this test every 3 years.      Talk with your health care provider about whether or not a prostate cancer screening test (PSA) is right for you.    Shots: Get a flu shot each year. Get a tetanus shot every 10 years.     Nutrition:    Eat at least 5 servings of fruits and vegetables daily.     Eat whole-grain bread, whole-wheat pasta and brown rice instead of white grains and rice.     Get adequate Calcium and Vitamin D.     Lifestyle    Exercise for at least 150 minutes a week (30 minutes a day, 5 days a week). This will help you control your weight and prevent disease.     Limit alcohol to one drink per day.     No smoking.     Wear sunscreen to prevent skin cancer.     See your dentist every six months for an exam and cleaning.

## 2021-03-26 ENCOUNTER — OFFICE VISIT (OUTPATIENT)
Dept: FAMILY MEDICINE | Facility: CLINIC | Age: 41
End: 2021-03-26
Payer: COMMERCIAL

## 2021-03-26 VITALS
HEART RATE: 60 BPM | OXYGEN SATURATION: 99 % | HEIGHT: 70 IN | WEIGHT: 196.6 LBS | BODY MASS INDEX: 28.15 KG/M2 | DIASTOLIC BLOOD PRESSURE: 73 MMHG | TEMPERATURE: 97 F | SYSTOLIC BLOOD PRESSURE: 123 MMHG

## 2021-03-26 DIAGNOSIS — Z23 NEED FOR TDAP VACCINATION: ICD-10-CM

## 2021-03-26 DIAGNOSIS — Z00.00 ROUTINE GENERAL MEDICAL EXAMINATION AT A HEALTH CARE FACILITY: Primary | ICD-10-CM

## 2021-03-26 DIAGNOSIS — E78.5 DYSLIPIDEMIA: ICD-10-CM

## 2021-03-26 DIAGNOSIS — M25.531 RIGHT WRIST PAIN: ICD-10-CM

## 2021-03-26 PROCEDURE — 90715 TDAP VACCINE 7 YRS/> IM: CPT | Performed by: FAMILY MEDICINE

## 2021-03-26 PROCEDURE — 90471 IMMUNIZATION ADMIN: CPT | Performed by: FAMILY MEDICINE

## 2021-03-26 PROCEDURE — 99213 OFFICE O/P EST LOW 20 MIN: CPT | Mod: 25 | Performed by: FAMILY MEDICINE

## 2021-03-26 PROCEDURE — 99396 PREV VISIT EST AGE 40-64: CPT | Mod: 25 | Performed by: FAMILY MEDICINE

## 2021-03-26 RX ORDER — ATORVASTATIN CALCIUM 20 MG/1
20 TABLET, FILM COATED ORAL DAILY
Qty: 90 TABLET | Refills: 3 | Status: SHIPPED | OUTPATIENT
Start: 2021-03-26 | End: 2022-06-23

## 2021-03-26 SDOH — ECONOMIC STABILITY: FOOD INSECURITY: WITHIN THE PAST 12 MONTHS, THE FOOD YOU BOUGHT JUST DIDN'T LAST AND YOU DIDN'T HAVE MONEY TO GET MORE.: NOT ASKED

## 2021-03-26 SDOH — ECONOMIC STABILITY: INCOME INSECURITY: HOW HARD IS IT FOR YOU TO PAY FOR THE VERY BASICS LIKE FOOD, HOUSING, MEDICAL CARE, AND HEATING?: NOT ASKED

## 2021-03-26 SDOH — ECONOMIC STABILITY: TRANSPORTATION INSECURITY
IN THE PAST 12 MONTHS, HAS LACK OF TRANSPORTATION KEPT YOU FROM MEETINGS, WORK, OR FROM GETTING THINGS NEEDED FOR DAILY LIVING?: NOT ASKED

## 2021-03-26 SDOH — ECONOMIC STABILITY: FOOD INSECURITY: WITHIN THE PAST 12 MONTHS, YOU WORRIED THAT YOUR FOOD WOULD RUN OUT BEFORE YOU GOT MONEY TO BUY MORE.: NOT ASKED

## 2021-03-26 SDOH — ECONOMIC STABILITY: TRANSPORTATION INSECURITY
IN THE PAST 12 MONTHS, HAS THE LACK OF TRANSPORTATION KEPT YOU FROM MEDICAL APPOINTMENTS OR FROM GETTING MEDICATIONS?: NOT ASKED

## 2021-03-26 ASSESSMENT — MIFFLIN-ST. JEOR: SCORE: 1785.08

## 2021-03-26 ASSESSMENT — ENCOUNTER SYMPTOMS: ARTHRALGIAS: 1

## 2021-09-12 ENCOUNTER — HEALTH MAINTENANCE LETTER (OUTPATIENT)
Age: 41
End: 2021-09-12

## 2022-06-15 ENCOUNTER — DOCUMENTATION ONLY (OUTPATIENT)
Dept: LAB | Facility: CLINIC | Age: 42
End: 2022-06-15
Payer: COMMERCIAL

## 2022-06-15 DIAGNOSIS — E78.5 DYSLIPIDEMIA: Primary | ICD-10-CM

## 2022-06-15 DIAGNOSIS — Z13.1 SCREENING FOR DIABETES MELLITUS: ICD-10-CM

## 2022-06-15 NOTE — PROGRESS NOTES
Patient has an upcoming lab appointment on 06.21.22 at 09:15 am. Please review and place future orders that may be needed. Otherwise please call patient to cancel lab appointment.    Thank you,  [facility] lab staff

## 2022-06-19 ENCOUNTER — HEALTH MAINTENANCE LETTER (OUTPATIENT)
Age: 42
End: 2022-06-19

## 2022-06-21 ENCOUNTER — LAB (OUTPATIENT)
Dept: LAB | Facility: CLINIC | Age: 42
End: 2022-06-21
Payer: COMMERCIAL

## 2022-06-21 DIAGNOSIS — Z13.1 SCREENING FOR DIABETES MELLITUS: ICD-10-CM

## 2022-06-21 DIAGNOSIS — E78.5 DYSLIPIDEMIA: ICD-10-CM

## 2022-06-21 LAB
ALBUMIN SERPL-MCNC: 3.9 G/DL (ref 3.4–5)
ALP SERPL-CCNC: 76 U/L (ref 40–150)
ALT SERPL W P-5'-P-CCNC: 30 U/L (ref 0–70)
ANION GAP SERPL CALCULATED.3IONS-SCNC: 3 MMOL/L (ref 3–14)
AST SERPL W P-5'-P-CCNC: 19 U/L (ref 0–45)
BILIRUB SERPL-MCNC: 0.6 MG/DL (ref 0.2–1.3)
BUN SERPL-MCNC: 19 MG/DL (ref 7–30)
CALCIUM SERPL-MCNC: 9.4 MG/DL (ref 8.5–10.1)
CHLORIDE BLD-SCNC: 107 MMOL/L (ref 94–109)
CHOLEST SERPL-MCNC: 238 MG/DL
CO2 SERPL-SCNC: 30 MMOL/L (ref 20–32)
CREAT SERPL-MCNC: 1.01 MG/DL (ref 0.66–1.25)
FASTING STATUS PATIENT QL REPORTED: YES
GFR SERPL CREATININE-BSD FRML MDRD: >90 ML/MIN/1.73M2
GLUCOSE BLD-MCNC: 99 MG/DL (ref 70–99)
HBA1C MFR BLD: 5.4 % (ref 0–5.6)
HDLC SERPL-MCNC: 64 MG/DL
LDLC SERPL CALC-MCNC: 148 MG/DL
NONHDLC SERPL-MCNC: 174 MG/DL
POTASSIUM BLD-SCNC: 4.4 MMOL/L (ref 3.4–5.3)
PROT SERPL-MCNC: 7.2 G/DL (ref 6.8–8.8)
SODIUM SERPL-SCNC: 140 MMOL/L (ref 133–144)
TRIGL SERPL-MCNC: 132 MG/DL

## 2022-06-21 PROCEDURE — 80061 LIPID PANEL: CPT

## 2022-06-21 PROCEDURE — 80053 COMPREHEN METABOLIC PANEL: CPT

## 2022-06-21 PROCEDURE — 36415 COLL VENOUS BLD VENIPUNCTURE: CPT

## 2022-06-21 PROCEDURE — 83036 HEMOGLOBIN GLYCOSYLATED A1C: CPT

## 2022-06-23 ENCOUNTER — OFFICE VISIT (OUTPATIENT)
Dept: FAMILY MEDICINE | Facility: CLINIC | Age: 42
End: 2022-06-23
Payer: COMMERCIAL

## 2022-06-23 VITALS
BODY MASS INDEX: 29.92 KG/M2 | SYSTOLIC BLOOD PRESSURE: 126 MMHG | WEIGHT: 209 LBS | OXYGEN SATURATION: 98 % | HEIGHT: 70 IN | HEART RATE: 56 BPM | TEMPERATURE: 97.3 F | DIASTOLIC BLOOD PRESSURE: 82 MMHG

## 2022-06-23 DIAGNOSIS — Z00.00 ROUTINE GENERAL MEDICAL EXAMINATION AT A HEALTH CARE FACILITY: Primary | ICD-10-CM

## 2022-06-23 DIAGNOSIS — E78.5 DYSLIPIDEMIA: ICD-10-CM

## 2022-06-23 DIAGNOSIS — R06.83 SNORING: ICD-10-CM

## 2022-06-23 PROCEDURE — 99213 OFFICE O/P EST LOW 20 MIN: CPT | Mod: 25 | Performed by: FAMILY MEDICINE

## 2022-06-23 PROCEDURE — 99396 PREV VISIT EST AGE 40-64: CPT | Performed by: FAMILY MEDICINE

## 2022-06-23 RX ORDER — ATORVASTATIN CALCIUM 20 MG/1
20 TABLET, FILM COATED ORAL DAILY
Qty: 90 TABLET | Refills: 3 | Status: SHIPPED | OUTPATIENT
Start: 2022-06-23 | End: 2022-09-19

## 2022-06-23 ASSESSMENT — ENCOUNTER SYMPTOMS
NERVOUS/ANXIOUS: 0
DIZZINESS: 0
NAUSEA: 0
PALPITATIONS: 0
HEMATOCHEZIA: 0
MYALGIAS: 0
FREQUENCY: 0
WEAKNESS: 0
COUGH: 0
HEADACHES: 0
DYSURIA: 0
HEARTBURN: 0
SORE THROAT: 0
DIARRHEA: 0
PARESTHESIAS: 0
CONSTIPATION: 0
EYE PAIN: 0
SHORTNESS OF BREATH: 0
ABDOMINAL PAIN: 0
JOINT SWELLING: 0
HEMATURIA: 0
ARTHRALGIAS: 0
FEVER: 0
CHILLS: 0

## 2022-06-23 ASSESSMENT — PAIN SCALES - GENERAL: PAINLEVEL: NO PAIN (0)

## 2022-06-23 NOTE — PROGRESS NOTES
SUBJECTIVE:   CC: Lam Brandt is an 42 year old male who presents for preventative health visit.       Patient has been advised of split billing requirements and indicates understanding: Yes  Healthy Habits:     Getting at least 3 servings of Calcium per day:  Yes    Bi-annual eye exam:  NO    Dental care twice a year:  NO    Sleep apnea or symptoms of sleep apnea:  None    Diet:  Regular (no restrictions)    Frequency of exercise:  4-5 days/week    Duration of exercise:  45-60 minutes    Taking medications regularly:  Yes    Medication side effects:  None    PHQ-2 Total Score: 0    Additional concerns today:  No        Preventive -    Immunization History   Administered Date(s) Administered     COVID-19,PF,Sahra 04/12/2021     COVID-19,PF,Pfizer (12+ Yrs) 12/07/2021     Influenza Vaccine IM > 6 months Valent IIV4 (Alfuria,Fluzone) 09/10/2014, 02/24/2017, 11/14/2020, 12/07/2021     Influenza Vaccine, 6+MO IM (QUADRIVALENT W/PRESERVATIVES) 09/22/2015     TDAP Vaccine (Adacel) 03/07/2011     Tdap (Adacel,Boostrix) 03/26/2021       - Colon CA screen: Colonoscopy, age 50-75 every 10 years or FIT every year or Cologuard every 3 years  No Fx of colon cancer       -lipids screen: done     Diabetes screen: done         SH:    Marital status:    Kids: 2  Employment: [employer]   Exercise: trying to   Tobacco: no  Etoh: occasional   Recreational drugs: no     Today's PHQ-2 Score:   PHQ-2 ( 1999 Pfizer) 6/23/2022   Q1: Little interest or pleasure in doing things 0   Q2: Feeling down, depressed or hopeless 0   PHQ-2 Score 0   PHQ-2 Total Score (12-17 Years)- Positive if 3 or more points; Administer PHQ-A if positive -   Q1: Little interest or pleasure in doing things Not at all   Q2: Feeling down, depressed or hopeless Not at all   PHQ-2 Score 0       Abuse: Current or Past(Physical, Sexual or Emotional)- No  Do you feel safe in your environment? Yes        Social History     Tobacco Use     Smoking status:  Never Smoker     Smokeless tobacco: Never Used   Substance Use Topics     Alcohol use: Yes     Comment: 1 a week     If you drink alcohol do you typically have >3 drinks per day or >7 drinks per week? No    Alcohol Use 6/23/2022   Prescreen: >3 drinks/day or >7 drinks/week? No   Prescreen: >3 drinks/day or >7 drinks/week? -   No flowsheet data found.    Last PSA: No results found for: PSA    Reviewed orders with patient. Reviewed health maintenance and updated orders accordingly - Yes  Labs reviewed in EPIC  BP Readings from Last 3 Encounters:   06/23/22 126/82   03/26/21 123/73   08/09/19 117/72    Wt Readings from Last 3 Encounters:   06/23/22 94.8 kg (209 lb)   03/26/21 89.2 kg (196 lb 9.6 oz)   08/09/19 95.3 kg (210 lb)                  Patient Active Problem List   Diagnosis   (none) - all problems resolved or deleted     Past Surgical History:   Procedure Laterality Date     GENITOURINARY SURGERY  child    circ       Social History     Tobacco Use     Smoking status: Never Smoker     Smokeless tobacco: Never Used   Substance Use Topics     Alcohol use: Yes     Comment: 1 a week     Family History   Problem Relation Age of Onset     Diabetes Father      Lipids Father      Hypertension Father      Lipids Brother          Current Outpatient Medications   Medication Sig Dispense Refill     atorvastatin (LIPITOR) 20 MG tablet Take 1 tablet (20 mg) by mouth daily 90 tablet 3     No Known Allergies  Recent Labs   Lab Test 06/21/22  0909 03/24/21  0746 08/09/19  0833 04/25/18  1748 04/14/18  0919   A1C 5.4  --   --   --   --    * 101* 96  --  85   HDL 64 72 68  --  58   TRIG 132 63 96  --  72   ALT 30  --   --   --  33   CR 1.01 1.02  --   --   --    GFRESTIMATED >90 90  --   --   --    GFRESTBLACK  --  >90  --   --   --    POTASSIUM 4.4 3.8  --   --   --    TSH  --   --   --  2.18  --         Reviewed and updated as needed this visit by clinical staff   Tobacco  Allergies  Meds   Med Hx  Surg Hx  Fam Hx   "Soc Hx          Reviewed and updated as needed this visit by Provider                   Past Medical History:   Diagnosis Date     Hyperlipidemia LDL goal <160      Uncomplicated asthma     As a kid      Past Surgical History:   Procedure Laterality Date     GENITOURINARY SURGERY  child    circ       Review of Systems   Constitutional: Negative for chills and fever.   HENT: Negative for congestion, ear pain, hearing loss and sore throat.    Eyes: Negative for pain and visual disturbance.   Respiratory: Negative for cough and shortness of breath.    Cardiovascular: Negative for chest pain, palpitations and peripheral edema.   Gastrointestinal: Negative for abdominal pain, constipation, diarrhea, heartburn, hematochezia and nausea.   Genitourinary: Negative for dysuria, frequency, genital sores, hematuria, impotence, penile discharge and urgency.   Musculoskeletal: Negative for arthralgias, joint swelling and myalgias.   Skin: Negative for rash.   Neurological: Negative for dizziness, weakness, headaches and paresthesias.   Psychiatric/Behavioral: Negative for mood changes. The patient is not nervous/anxious.          OBJECTIVE:   /82 (BP Location: Left arm, Patient Position: Sitting, Cuff Size: Adult Regular)   Pulse 56   Temp 97.3  F (36.3  C) (Tympanic)   Ht 1.765 m (5' 9.5\")   Wt 94.8 kg (209 lb)   SpO2 98%   BMI 30.42 kg/m      Physical Exam  GENERAL: healthy, alert and no distress  EYES: Eyes grossly normal to inspection, PERRL and conjunctivae and sclerae normal  HENT: ear canals and TM's normal, nose and mouth without ulcers or lesions  NECK: no adenopathy, no asymmetry, masses, or scars and thyroid normal to palpation  RESP: lungs clear to auscultation - no rales, rhonchi or wheezes  CV: regular rate and rhythm, normal S1 S2, no S3 or S4, no murmur, click or rub, no peripheral edema and peripheral pulses strong  ABDOMEN: soft, nontender, no hepatosplenomegaly, no masses and bowel sounds " "normal  MS: no gross musculoskeletal defects noted, no edema  SKIN: no suspicious lesions or rashes  NEURO: Normal strength and tone, mentation intact and speech normal  PSYCH: mentation appears normal, affect normal/bright        ASSESSMENT/PLAN:   (Z00.00) Routine general medical examination at a health care facility  (primary encounter diagnosis)  Comment: Preventive care reviewed and updated.    Plan: repeat yearly     (E78.5) Dyslipidemia  Comment: chronic stable problem , continue Lipitor refill provided   Plan: atorvastatin (LIPITOR) 20 MG tablet      (R06.83) Snoring  Comment: snoring during sleep , concerning for MARIA E   Plan: Adult Sleep Eval & Management          Referral        Patient has been advised of split billing requirements and indicates understanding: Yes    COUNSELING:   Reviewed preventive health counseling, as reflected in patient instructions       Regular exercise       Healthy diet/nutrition    Estimated body mass index is 30.42 kg/m  as calculated from the following:    Height as of this encounter: 1.765 m (5' 9.5\").    Weight as of this encounter: 94.8 kg (209 lb).     Weight management plan: Discussed healthy diet and exercise guidelines    He reports that he has never smoked. He has never used smokeless tobacco.      Counseling Resources:  ATP IV Guidelines  Pooled Cohorts Equation Calculator  FRAX Risk Assessment  ICSI Preventive Guidelines  Dietary Guidelines for Americans, 2010  USDA's MyPlate  ASA Prophylaxis  Lung CA Screening    Cindy Brock MD  [facility]  "

## 2022-08-01 PROBLEM — E66.09 CLASS 1 OBESITY DUE TO EXCESS CALORIES WITHOUT SERIOUS COMORBIDITY WITH BODY MASS INDEX (BMI) OF 30.0 TO 30.9 IN ADULT: Chronic | Status: ACTIVE | Noted: 2022-08-01

## 2022-08-01 PROBLEM — E66.811 CLASS 1 OBESITY DUE TO EXCESS CALORIES WITHOUT SERIOUS COMORBIDITY WITH BODY MASS INDEX (BMI) OF 30.0 TO 30.9 IN ADULT: Chronic | Status: ACTIVE | Noted: 2022-08-01

## 2022-08-01 PROBLEM — E78.5 DYSLIPIDEMIA: Chronic | Status: ACTIVE | Noted: 2017-02-24

## 2022-08-02 ENCOUNTER — VIRTUAL VISIT (OUTPATIENT)
Dept: SLEEP MEDICINE | Facility: CLINIC | Age: 42
End: 2022-08-02
Attending: FAMILY MEDICINE
Payer: COMMERCIAL

## 2022-08-02 VITALS — BODY MASS INDEX: 29.92 KG/M2 | WEIGHT: 209 LBS | HEIGHT: 70 IN

## 2022-08-02 DIAGNOSIS — R06.83 SNORING: Primary | ICD-10-CM

## 2022-08-02 DIAGNOSIS — E66.09 CLASS 1 OBESITY DUE TO EXCESS CALORIES WITHOUT SERIOUS COMORBIDITY WITH BODY MASS INDEX (BMI) OF 30.0 TO 30.9 IN ADULT: Chronic | ICD-10-CM

## 2022-08-02 DIAGNOSIS — E66.811 CLASS 1 OBESITY DUE TO EXCESS CALORIES WITHOUT SERIOUS COMORBIDITY WITH BODY MASS INDEX (BMI) OF 30.0 TO 30.9 IN ADULT: Chronic | ICD-10-CM

## 2022-08-02 PROCEDURE — 99204 OFFICE O/P NEW MOD 45 MIN: CPT | Mod: GT | Performed by: INTERNAL MEDICINE

## 2022-08-02 ASSESSMENT — SLEEP AND FATIGUE QUESTIONNAIRES
HOW LIKELY ARE YOU TO NOD OFF OR FALL ASLEEP WHILE SITTING AND TALKING TO SOMEONE: WOULD NEVER DOZE
HOW LIKELY ARE YOU TO NOD OFF OR FALL ASLEEP WHILE SITTING AND READING: SLIGHT CHANCE OF DOZING
HOW LIKELY ARE YOU TO NOD OFF OR FALL ASLEEP WHILE LYING DOWN TO REST IN THE AFTERNOON WHEN CIRCUMSTANCES PERMIT: MODERATE CHANCE OF DOZING
HOW LIKELY ARE YOU TO NOD OFF OR FALL ASLEEP IN A CAR, WHILE STOPPED FOR A FEW MINUTES IN TRAFFIC: WOULD NEVER DOZE
HOW LIKELY ARE YOU TO NOD OFF OR FALL ASLEEP WHILE WATCHING TV: SLIGHT CHANCE OF DOZING
HOW LIKELY ARE YOU TO NOD OFF OR FALL ASLEEP WHILE SITTING INACTIVE IN A PUBLIC PLACE: SLIGHT CHANCE OF DOZING
HOW LIKELY ARE YOU TO NOD OFF OR FALL ASLEEP WHILE SITTING QUIETLY AFTER LUNCH WITHOUT ALCOHOL: SLIGHT CHANCE OF DOZING
HOW LIKELY ARE YOU TO NOD OFF OR FALL ASLEEP WHEN YOU ARE A PASSENGER IN A CAR FOR AN HOUR WITHOUT A BREAK: SLIGHT CHANCE OF DOZING

## 2022-08-02 NOTE — PROGRESS NOTES
Evan is a 42 year old who is being evaluated via a billable video visit.      How would you like to obtain your AVS? MyChart  If the video visit is dropped, the invitation should be resent by: Text to cell phone: [phone]  Will anyone else be joining your video visit? No        Video-Visit Details    Video Start Time: 1:01 PM    Type of service:  Video Visit    Video End Time:1:16 PM    Originating Location (pt. Location): Home    Distant Location (provider location):  [facility]     Platform used for Video Visit: Osfam Brewingsommer Paulina        Outpatient Sleep Medicine Consultation:      Name: Lam Brandt MRN# [id number]   Age: 42 year old YOB: 1980     Date of Consultation: August 2, 2022  Consultation is requested by: Cindy Brock MD  [id number] [place] [address] Cindy Brock  Primary care provider: Kamron Galaviz       Reason for Sleep Consult:     Lam Brandt is sent by Cindy Brock for a sleep consultation regarding snoring.    Patient s Reason for visit  Lam Brandt main reason for visit: SNORING  Patient states problem(s) started: I think I have always snored  Lam Brandt's goals for this visit: sleep better           Assessment and Plan:     Loud snoring, witnessed apneas, mild fatigue (ESS 7), obesity, large neck, crowded oropharynx   Patient appears to be a good candidate for Home Sleep Test STOPBANG 5     Summary Counseling:    Sleep Testing Reviewed  Obstructive Sleep Apnea Reviewed  Complications of Untreated Sleep Apnea Reviewed      I spent 15 minutes with patient including counseling, and 5 minutes with chart review, and documentation     CC: Cindy Brock          History of Present Illness:       SLEEP-WAKE SCHEDULE:     Work/School Days: Patient goes to school/work: Yes   Usually gets into bed at 10:15  Takes patient about 15 minutes to fall asleep  Has trouble falling asleep 1  nights per week  Wakes up in the middle of the night 4 times.  Wakes up due to Snorting self awake  He has trouble falling back asleep 2 times a week.   It usually takes depends to get back to sleep  Patient is usually up at 7:15  Uses alarm: Yes    Weekends/Non-work Days/All Other Days:  Usually gets into bed at 10:30   Takes patient about 15 minutes to fall asleep  Patient is usually up at 8  Uses alarm: No    Sleep Need  Patient gets  7 sleep on average   Patient thinks he needs about 8 sleep    Lam TAWANA Brandt prefers to sleep in this position(s): Stomach   Patient states they do the following activities in bed: Watch TV, Use phone, computer, or tablet    Naps  Patient takes a purposeful nap 0 times a week and naps are usually 0 in duration  He feels better after a nap: No  He dozes off unintentionally 0 days per week  Patient has had a driving accident or near-miss due to sleepiness/drowsiness: No      SLEEP DISRUPTIONS:    Breathing/Snoring  Patient snores:Yes   Other people complain about his snoring: Yes  Patient has been told he stops breathing in his sleep: Yes  He has issues with the following: Stuffy nose when you wake up    Movement:  Patient gets pain, discomfort, with an urge to move:  No  It happens when he is resting:  No  It happens more at night:  No  Patient has been told he kicks his legs at night:  No     Behaviors in Sleep:  Irineogladys Brandt has experienced the following behaviors while sleeping: Teeth grinding  He has experienced sudden muscle weakness during the day: No      CAFFEINE AND OTHER SUBSTANCES:    Patient consumes caffeinated beverages per day:  2  Last caffeine use is usually: 9 am  List of any prescribed or over the counter stimulants that patient takes: none  List of any prescribed or over the counter sleep medication patient takes:    List of previous sleep medications that patient has tried:    Patient drinks alcohol to help them sleep: No  Patient drinks alcohol  near bedtime: No    Family History:  Patient has a family member been diagnosed with a sleep disorder: Yes  my dad uses a CPAP         SCALES:    EPWORTH SLEEPINESS SCALE      Thompson Sleepiness Scale ( PEE Keita  [id number]<br>ESS - [place]/English - Final version - 21 Nov 07 - [facility] [place].) 8/2/2022   Sitting and reading Slight chance of dozing   Watching TV Slight chance of dozing   Sitting, inactive in a public place (e.g. a theatre or a meeting) Slight chance of dozing   As a passenger in a car for an hour without a break Slight chance of dozing   Lying down to rest in the afternoon when circumstances permit Moderate chance of dozing   Sitting and talking to someone Would never doze   Sitting quietly after a lunch without alcohol Slight chance of dozing   In a car, while stopped for a few minutes in traffic Would never doze   Thompson Score (MC) 7   Thompson Score (Sleep) 7         INSOMNIA SEVERITY INDEX (VIVEK)      Insomnia Severity Index (VIVEK) 8/2/2022   Difficulty falling asleep 1   Difficulty staying asleep 2   Problems waking up too early 2   How SATISFIED/DISSATISFIED are you with your CURRENT sleep pattern? 2   How NOTICEABLE to others do you think your sleep problem is in terms of impairing the quality of your life? 2   How WORRIED/DISTRESSED are you about your current sleep problem? 1   To what extent do you consider your sleep problem to INTERFERE with your daily functioning (e.g. daytime fatigue, mood, ability to function at work/daily chores, concentration, memory, mood, etc.) CURRENTLY? 2   VIVEK Total Score 12       Guidelines for Scoring/Interpretation:  Total score categories:  0-7 = No clinically significant insomnia   8-14 = Subthreshold insomnia   15-21 = Clinical insomnia (moderate severity)  22-28 = Clinical insomnia (severe)  Used via courtesy of www.Galazarealth.va.gov with permission from Chris Pérez PhD., [facility]          Allergies:    No Known Allergies    Medications:     Current Outpatient Medications   Medication Sig Dispense Refill     atorvastatin (LIPITOR) 20 MG tablet Take 1 tablet (20 mg) by mouth daily 90 tablet 3       Problem List:  Patient Active Problem List    Diagnosis Date Noted     Dyslipidemia 02/24/2017     Priority: Medium     Class 1 obesity due to excess calories without serious comorbidity with body mass index (BMI) of 30.0 to 30.9 in adult 08/01/2022     Priority: Low        Past Medical/Surgical History:  Past Medical History:   Diagnosis Date     Hyperlipidemia LDL goal <160      Uncomplicated asthma 1995    As a kid     Past Surgical History:   Procedure Laterality Date     GENITOURINARY SURGERY  1978    circ child       Social History:  Social History     Socioeconomic History     Marital status:      Spouse name: Not on file     Number of children: 2     Years of education: Not on file     Highest education level: Not on file   Occupational History     Occupation: Finance   Tobacco Use     Smoking status: Never Smoker     Smokeless tobacco: Never Used   Vaping Use     Vaping Use: Never used   Substance and Sexual Activity     Alcohol use: Yes     Comment: 1 a week     Drug use: No     Sexual activity: Yes     Partners: Female     Birth control/protection: Condom   Other Topics Concern     Parent/sibling w/ CABG, MI or angioplasty before 65F 55M? 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     Social Determinants of Health     Financial Resource Strain: Not on file   Food Insecurity: Not on file   Transportation Needs: Not on file   Physical Activity: Not on file   Stress: Not on file   Social Connections: Not on file   Intimate Partner Violence: Not on file   Housing Stability: Not on file       Family History:  Family History   Problem Relation Age of Onset     Diabetes Father      Lipids Father       "Hypertension Father      Lipids Brother        Review of Systems:  A complete review of systems reviewed by me is negative with the exeption of what has been mentioned in the history of present illness.  In the last TWO WEEKS have you experienced any of the following symptoms?  Fevers: No  Night Sweats: No  Weight Gain: No  Pain at Night: No  Double Vision: No  Changes in Vision: No  Difficulty Breathing through Nose: No  Sore Throat in Morning: No  Dry Mouth in the Morning: No  Shortness of Breath Lying Flat: No  Shortness of Breath With Activity: No  Awakening with Shortness of Breath: No  Increased Cough: No  Heart Racing at Night: No  Swelling in Feet or Legs: No  Diarrhea at Night: No  Heartburn at Night: No  Urinating More than Once at Night: No  Losing Control of Urine at Night: No  Joint Pains at Night: No  Headaches in Morning: No  Weakness in Arms or Legs: No  Depressed Mood: No  Anxiety: No     Physical Examination:  Vitals: Ht 1.765 m (5' 9.5\")   Wt 94.8 kg (209 lb)   BMI 30.42 kg/m    BMI= Body mass index is 30.42 kg/m .    SpO2 Readings from Last 4 Encounters:   06/23/22 98%   03/26/21 99%   04/25/18 99%   02/24/17 98%       GENERAL APPEARANCE: alert and no distress  EYES: Eyes grossly normal to inspection  HENT: mouth without ulcers or lesions  NECK:  generous size, 17  LUNGS: no shortness of breath , cough  NEURO: mentation intact, speech normal and cranial nerves 2-12 appear intact  PSYCH: affect normal/bright  Mallampati Class: 3            Data: All pertinent previous laboratory data reviewed     Recent Labs   Lab Test 06/21/22  0909 03/24/21  0746    141   POTASSIUM 4.4 3.8   CHLORIDE 107 108   CO2 30 30   ANIONGAP 3 3   GLC 99 89   BUN 19 24   CR 1.01 1.02   NAKUL 9.4 8.9       Recent Labs   Lab Test 01/24/17  1559   WBC 7.2   RBC 5.19   HGB 15.3   HCT 44.6   MCV 86   MCH 29.5   MCHC 34.3   RDW 13.0          Recent Labs   Lab Test 06/21/22  0909   PROTTOTAL 7.2   ALBUMIN 3.9 "   BILITOTAL 0.6   ALKPHOS 76   AST 19   ALT 30       TSH (mU/L)   Date Value   04/25/2018 2.18       Mendel Riggs MD 8/2/2022

## 2022-08-03 NOTE — NURSING NOTE
My Chart message sent to patient:  Eleazar Gordon,    This is a quick message following up on your 8/2/2022 appointment with Dr. Riggs.  It looks like he ordered a home sleep study and a follow-up appointment with him to go over the results of the sleep study. If this is something you would like to move forward in scheduling, please call us at [phone].      Thank you!    [facility]  Madai Sandhu CMA

## 2022-08-04 ENCOUNTER — TELEPHONE (OUTPATIENT)
Dept: SLEEP MEDICINE | Facility: CLINIC | Age: 42
End: 2022-08-04

## 2022-09-19 DIAGNOSIS — E78.5 DYSLIPIDEMIA: ICD-10-CM

## 2022-09-19 RX ORDER — ATORVASTATIN CALCIUM 20 MG/1
20 TABLET, FILM COATED ORAL DAILY
Qty: 90 TABLET | Refills: 3 | Status: SHIPPED | OUTPATIENT
Start: 2022-09-19 | End: 2023-07-20

## 2022-10-05 ENCOUNTER — OFFICE VISIT (OUTPATIENT)
Dept: SLEEP MEDICINE | Facility: CLINIC | Age: 42
End: 2022-10-05
Attending: INTERNAL MEDICINE
Payer: COMMERCIAL

## 2022-10-05 DIAGNOSIS — R06.83 SNORING: ICD-10-CM

## 2022-10-05 DIAGNOSIS — E66.09 CLASS 1 OBESITY DUE TO EXCESS CALORIES WITHOUT SERIOUS COMORBIDITY WITH BODY MASS INDEX (BMI) OF 30.0 TO 30.9 IN ADULT: Chronic | ICD-10-CM

## 2022-10-05 DIAGNOSIS — E66.811 CLASS 1 OBESITY DUE TO EXCESS CALORIES WITHOUT SERIOUS COMORBIDITY WITH BODY MASS INDEX (BMI) OF 30.0 TO 30.9 IN ADULT: Chronic | ICD-10-CM

## 2022-10-05 PROCEDURE — G0399 HOME SLEEP TEST/TYPE 3 PORTA: HCPCS | Performed by: INTERNAL MEDICINE

## 2022-10-05 NOTE — PROGRESS NOTES
Pt is completing a home sleep test. Pt was instructed on how to put on the Noxturnal T3 device and associated equipment before going to bed and given the opportunity to practice putting it on before leaving the sleep center. Pt was reminded to bring the home sleep test kit back to the center tomorrow, at agreed upon time for download and reporting.  Keiko Balderas CMA, HST Specialist  [place] / [facility]

## 2022-10-06 ENCOUNTER — DOCUMENTATION ONLY (OUTPATIENT)
Dept: SLEEP MEDICINE | Facility: CLINIC | Age: 42
End: 2022-10-06

## 2022-10-06 NOTE — PROGRESS NOTES
This HSAT was performed using a Noxturnal T3 device which recorded snore, sound, movement activity, body position, nasal pressure, oronasal thermal airflow, pulse, oximetry and both chest and abdominal respiratory effort. HSAT data was restricted to the time patient states they were in bed.     HSAT was scored using 1B 4% hypopnea rule.     HST AHI (Non-PAT): 2.8  Snoring was reported as intermittent.  Time with SpO2 below 89% was 0 minutes.   Overall signal quality was good     Pt will follow up with sleep provider to determine appropriate therapy.

## 2022-10-07 NOTE — PROGRESS NOTES
HST POST-STUDY QUESTIONNAIRE    1. What time did you go to bed?  22:15  2. How long do you think it took to fall asleep?  15 MIN  3. What time did you wake up to start the day?  06:45  4. Did you get up during the night at all?  YES  5. If you woke up, do you remember approximately what time(s)? 04:30  6. Did you have any difficulty with the equipment?  No  7. Did you us any type of treatment with this study?  None  8. Was the head of the bed elevated? No  9. Did you sleep in a recliner?  No  10. Did you stop using CPAP at least 3 days before this test?  NA  11. Any other information you'd like us to know?     Keiko Balderas CMA, HST Specialist  [place] / [facility]

## 2022-10-08 NOTE — PROCEDURES
"HOME SLEEP STUDY INTERPRETATION        Patient: Lam Brandt  MRN: [id number]  YOB: 1980  Study Date: 10/5/2022  PCP/Referring Provider: Kamron Galaviz;  Ordering Provider: Mendel Riggs MD         Indications for Home Study: Lam Brandt is a 42 year old male with symptoms suggestive of obstructive sleep apnea.    Estimated body mass index is 30.42 kg/m  as calculated from the following:    Height as of 8/2/22: 1.765 m (5' 9.5\").    Weight as of 8/2/22: 94.8 kg (209 lb).  Total score - [place]: 7 (8/2/2022  9:33 AM)  StopBang Total Score: 5 (8/2/2022  1:00 PM)Total Score: 5 (8/2/2022  9:34 AM)        Data: A full night home sleep study was performed recording the standard physiologic parameters including body position, movement, sound, nasal pressure, thermal oral airflow, chest and abdominal movements with respiratory inductance plethysmography, and oxygen saturation by pulse oximetry. Pulse rate was estimated by oximetry recording. This study was considered adequate based on > 4 hours of quality oximetry and respiratory recording. As specified by the AASM Manual for the Scoring of Sleep and Associated events, version 2.3, Rule VIII.D 1B, 4% oxygen desaturation scoring for hypopneas is used as a standard of care on all home sleep apnea testing.        Analysis Time:  473 minutes        Respiration:   Sleep Associated Hypoxemia: sustained hypoxemia was not present. Baseline oxygen saturation was 93%.  Time with saturation less than or equal to 88% was 0 minutes. The lowest oxygen saturation was 89%.   Snoring: Snoring was present.  Respiratory events: The home study revealed a presence of 9 obstructive apneas and 3 mixed and central apneas. There were 10 hypopneas resulting in a combined apnea/hypopnea index [AHI] of 2 events per hour.  AHI was 7 per hour supine, 0 per hour prone, 0 per hour on left side, and 0 per hour on right side.   Pattern: Excluding events noted above, " respiratory rate and pattern was Normal.      Position: Percent of time spent: supine - 35%, prone - 24%, on left - 2%, on right - 40%.      Heart Rate: By pulse oximetry normal rate was noted.       Assessment:     Mo evidence of severe obstructive sleep apnea.    Sleep associated hypoxemia was not present.    Supine associated  sleep disordered breathing was present    Recommendations:    Consider attended polysomnogram if clinical suspicion of obstructive sleep apnea is moderate or high.     Suggest optimizing sleep hygiene and avoiding sleep deprivation.    Weight management.        Diagnosis Code(s): Snoring R06.83    Mendel Riggs MD, October 8, 2022   Diplomate, American Board of Internal Medicine, Sleep Medicine

## 2022-10-25 ENCOUNTER — VIRTUAL VISIT (OUTPATIENT)
Dept: SLEEP MEDICINE | Facility: CLINIC | Age: 42
End: 2022-10-25
Payer: COMMERCIAL

## 2022-10-25 VITALS — HEIGHT: 69 IN | WEIGHT: 205 LBS | BODY MASS INDEX: 30.36 KG/M2

## 2022-10-25 DIAGNOSIS — R06.83 SNORING: Primary | ICD-10-CM

## 2022-10-25 PROCEDURE — 99213 OFFICE O/P EST LOW 20 MIN: CPT | Mod: GT | Performed by: INTERNAL MEDICINE

## 2022-10-25 ASSESSMENT — SLEEP AND FATIGUE QUESTIONNAIRES
HOW LIKELY ARE YOU TO NOD OFF OR FALL ASLEEP WHILE SITTING INACTIVE IN A PUBLIC PLACE: WOULD NEVER DOZE
HOW LIKELY ARE YOU TO NOD OFF OR FALL ASLEEP WHEN YOU ARE A PASSENGER IN A CAR FOR AN HOUR WITHOUT A BREAK: WOULD NEVER DOZE
HOW LIKELY ARE YOU TO NOD OFF OR FALL ASLEEP WHILE SITTING AND TALKING TO SOMEONE: WOULD NEVER DOZE
HOW LIKELY ARE YOU TO NOD OFF OR FALL ASLEEP WHILE WATCHING TV: MODERATE CHANCE OF DOZING
HOW LIKELY ARE YOU TO NOD OFF OR FALL ASLEEP WHILE SITTING AND READING: MODERATE CHANCE OF DOZING
HOW LIKELY ARE YOU TO NOD OFF OR FALL ASLEEP WHILE LYING DOWN TO REST IN THE AFTERNOON WHEN CIRCUMSTANCES PERMIT: HIGH CHANCE OF DOZING
HOW LIKELY ARE YOU TO NOD OFF OR FALL ASLEEP IN A CAR, WHILE STOPPED FOR A FEW MINUTES IN TRAFFIC: WOULD NEVER DOZE
HOW LIKELY ARE YOU TO NOD OFF OR FALL ASLEEP WHILE SITTING QUIETLY AFTER LUNCH WITHOUT ALCOHOL: SLIGHT CHANCE OF DOZING

## 2022-10-25 ASSESSMENT — PAIN SCALES - GENERAL: PAINLEVEL: NO PAIN (0)

## 2022-10-25 NOTE — PATIENT INSTRUCTIONS
Positioning Device lori, slumber bump, tennis ball tshirt.   This example shows a pillow that straps around the waist. It may be appropriate for those whose sleep study shows milder sleep apnea that occurs primarily when lying flat on one's back. Preliminary studies have shown benefit but effectiveness at home should be verified.                         Your BMI is Body mass index is 30.27 kg/m .    What is BMI?  Body mass index (BMI) is one way to tell whether you are at a healthy weight, overweight, or obese. It measures your weight in relation to your height.  A BMI of 18.5 to 24.9 is in the healthy range. A person with a BMI of 25 to 29.9 is considered overweight, and someone with a BMI of 30 or greater is considered obese.  Another way to find out if you are at risk for health problems caused by overweight and obesity is to measure your waist. If you are a woman and your waist is more than 35 inches, or if you are a man and your waist is more than 40 inches, your risk of disease may be higher.  More than two-thirds of American adults are considered overweight or obese. Being overweight or obese increases the risk for further weight gain.  Excess weight may lead to heart disease and diabetes. Creating and following plans for healthy eating and physical activity may help you improve your health.    Methods for maintaining or losing weight.  Weight control is part of healthy lifestyle and includes exercise, emotional health, and healthy eating habits.  Careful eating habits lifelong is the mainstay of weight control.  Though there are significant health benefits from weight loss, long-term weight loss with diet alone may be very difficult to achieve- studies show long-term success with dietary management in less than 10% of people. Attaining a healthy weight may be especially difficult to achieve in those with severe obesity. In some cases, medications, devices and surgical management might be considered.    What  can you do?  If you are overweight or obese and are interested in methods for weight loss, you should discuss this with your provider. In addition, we recommend that you review healthy life styles and methods for weight loss available through the National Institutes of Health patient information sites:   http://win.niddk.nih.gov/publications/index.htm

## 2022-10-25 NOTE — PROGRESS NOTES
"Evan is a 42 year old who is being evaluated via a billable video visit.      How would you like to obtain your AVS? MyChart  If the video visit is dropped, the invitation should be resent by: Text to cell phone: [phone]  Will anyone else be joining your video visit? Liz      Kelly Jermaine      Video-Visit Details    Video Start Time: 10:26 AM    Type of service:  Video Visit    Video End Time:10:37 AM    Originating Location (pt. Location): Home        Distant Location (provider location):  Off-site    Platform used for Video Visit: [employer]ja         Home Sleep Apnea Testing Results Visit:    Chief Complaint   Patient presents with     Video Visit     Follow Up        Lam Brandt is a 42 year old male who had Home Sleep Apnea Testing.  He presented with loud snoring, witnessed apneas, mild fatigue (ESS 7), obesity, large neck, crowded oropharynx  .    Estimated body mass index is 30.27 kg/m  as calculated from the following:    Height as of this encounter: 1.753 m (5' 9\").    Weight as of this encounter: 93 kg (205 lb).  Total score - [place]: 8 (10/25/2022 10:15 AM)   StopBang Total Score: 5 (8/2/2022  1:00 PM)Total Score: 5 (8/2/2022  9:34 AM)  VIVEK Total Score: 9    Home Sleep Apnea Testing - 10/25/22: 205 lbs 0 oz: AHI 2/hr.   AHI was 7 per hour supine, 0 per hour prone, 0 per hour on left side, and 0 per hour on right side.   Oxygen Ady of 89%.  Baseline 93%.  Sp02 =< 88% for 0 minutes  He slept on his back (35%), prone (24%), left (2%) and right (40%) sides.   Analysis time: 473 minutes.     Signal quality of Oxymeter 99% Good  Nasal Cannula 100% Good  RIP belts 100% Good.     Lam Brandt reports that he slept Good .         Past medical/surgical history, family history, social history, medications and allergies were reviewed.      Ht 1.753 m (5' 9\")   Wt 93 kg (205 lb)   BMI 30.27 kg/m      Impression/Plan:    No evidence of severe obstructive sleep apnea. Supine associated  sleep " disordered breathing was present   - Elect no further evaluation at this time   - Signs and symptoms that would prompt re-evaluation were discussed.    Obesity  We discussed the link between obesity, sleep apnea, and snoring  - See patient instructions        I spent 10 minutes with patient including counseling, and 5 minutes with chart review, and documentation

## 2022-11-19 ENCOUNTER — HEALTH MAINTENANCE LETTER (OUTPATIENT)
Age: 42
End: 2022-11-19

## 2023-07-20 DIAGNOSIS — E78.5 DYSLIPIDEMIA: ICD-10-CM

## 2023-07-20 RX ORDER — ATORVASTATIN CALCIUM 20 MG/1
20 TABLET, FILM COATED ORAL DAILY
Qty: 90 TABLET | Refills: 3 | Status: SHIPPED | OUTPATIENT
Start: 2023-07-20

## 2023-09-09 ENCOUNTER — HEALTH MAINTENANCE LETTER (OUTPATIENT)
Age: 43
End: 2023-09-09

## 2024-11-02 ENCOUNTER — HEALTH MAINTENANCE LETTER (OUTPATIENT)
Age: 44
End: 2024-11-02

## 2025-08-15 ENCOUNTER — ANCILLARY PROCEDURE (OUTPATIENT)
Dept: GENERAL RADIOLOGY | Facility: CLINIC | Age: 45
End: 2025-08-15
Attending: PHYSICIAN ASSISTANT
Payer: COMMERCIAL

## 2025-08-15 DIAGNOSIS — R05.3 PERSISTENT COUGH: ICD-10-CM

## 2025-08-15 PROCEDURE — 71046 X-RAY EXAM CHEST 2 VIEWS: CPT | Mod: TC | Performed by: RADIOLOGY

## 2025-08-20 ENCOUNTER — HOSPITAL ENCOUNTER (OUTPATIENT)
Dept: CT IMAGING | Facility: HOSPITAL | Age: 45
Discharge: HOME | End: 2025-08-20
Attending: NURSE PRACTITIONER
Payer: COMMERCIAL

## 2025-08-20 ENCOUNTER — OFFICE VISIT (OUTPATIENT)
Dept: FAMILY MEDICINE | Facility: CLINIC | Age: 45
End: 2025-08-20
Payer: COMMERCIAL

## 2025-08-20 VITALS
HEIGHT: 69 IN | DIASTOLIC BLOOD PRESSURE: 87 MMHG | BODY MASS INDEX: 33.33 KG/M2 | SYSTOLIC BLOOD PRESSURE: 135 MMHG | HEART RATE: 80 BPM | WEIGHT: 225 LBS | TEMPERATURE: 98 F | OXYGEN SATURATION: 98 %

## 2025-08-20 DIAGNOSIS — R06.02 SHORTNESS OF BREATH: Primary | ICD-10-CM

## 2025-08-20 DIAGNOSIS — R06.02 SHORTNESS OF BREATH: ICD-10-CM

## 2025-08-20 PROCEDURE — 71260 CT THORAX DX C+: CPT

## 2025-08-20 PROCEDURE — 3079F DIAST BP 80-89 MM HG: CPT | Performed by: NURSE PRACTITIONER

## 2025-08-20 PROCEDURE — 1126F AMNT PAIN NOTED NONE PRSNT: CPT | Performed by: NURSE PRACTITIONER

## 2025-08-20 PROCEDURE — 250N000011 HC RX IP 250 OP 636: Performed by: NURSE PRACTITIONER

## 2025-08-20 PROCEDURE — 250N000009 HC RX 250: Performed by: NURSE PRACTITIONER

## 2025-08-20 PROCEDURE — 99214 OFFICE O/P EST MOD 30 MIN: CPT | Performed by: NURSE PRACTITIONER

## 2025-08-20 PROCEDURE — 3075F SYST BP GE 130 - 139MM HG: CPT | Performed by: NURSE PRACTITIONER

## 2025-08-20 RX ORDER — IOPAMIDOL 755 MG/ML
90 INJECTION, SOLUTION INTRAVASCULAR ONCE
Status: COMPLETED | OUTPATIENT
Start: 2025-08-20 | End: 2025-08-20

## 2025-08-20 RX ADMIN — SODIUM CHLORIDE 100 ML: 9 INJECTION, SOLUTION INTRAVENOUS at 17:35

## 2025-08-20 RX ADMIN — IOPAMIDOL 90 ML: 755 INJECTION, SOLUTION INTRAVENOUS at 17:35

## 2025-08-20 ASSESSMENT — PAIN SCALES - GENERAL: PAINLEVEL_OUTOF10: NO PAIN (0)

## 2025-08-25 ENCOUNTER — DOCUMENTATION ONLY (OUTPATIENT)
Dept: PULMONOLOGY | Facility: CLINIC | Age: 45
End: 2025-08-25
Payer: COMMERCIAL

## 2025-09-02 ENCOUNTER — PRE VISIT (OUTPATIENT)
Dept: PULMONOLOGY | Facility: CLINIC | Age: 45
End: 2025-09-02
Payer: COMMERCIAL

## 2025-09-02 ENCOUNTER — VIRTUAL VISIT (OUTPATIENT)
Dept: PULMONOLOGY | Facility: CLINIC | Age: 45
End: 2025-09-02
Attending: STUDENT IN AN ORGANIZED HEALTH CARE EDUCATION/TRAINING PROGRAM
Payer: COMMERCIAL

## 2025-09-02 VITALS — HEIGHT: 69 IN | WEIGHT: 225 LBS | BODY MASS INDEX: 33.33 KG/M2

## 2025-09-02 DIAGNOSIS — R91.1 LUNG NODULE: Primary | ICD-10-CM

## 2025-09-02 PROCEDURE — 98002 SYNCH AUDIO-VIDEO NEW MOD 45: CPT | Performed by: STUDENT IN AN ORGANIZED HEALTH CARE EDUCATION/TRAINING PROGRAM

## 2025-09-02 PROCEDURE — 1126F AMNT PAIN NOTED NONE PRSNT: CPT | Mod: 95 | Performed by: STUDENT IN AN ORGANIZED HEALTH CARE EDUCATION/TRAINING PROGRAM

## 2025-09-02 RX ORDER — PREDNISONE 20 MG/1
40 TABLET ORAL DAILY
Qty: 10 TABLET | Refills: 0 | Status: SHIPPED | OUTPATIENT
Start: 2025-09-02

## 2025-09-02 ASSESSMENT — PAIN SCALES - GENERAL: PAINLEVEL_OUTOF10: NO PAIN (0)
